# Patient Record
Sex: MALE | Race: WHITE | Employment: FULL TIME | URBAN - METROPOLITAN AREA
[De-identification: names, ages, dates, MRNs, and addresses within clinical notes are randomized per-mention and may not be internally consistent; named-entity substitution may affect disease eponyms.]

---

## 2021-06-14 ENCOUNTER — HOSPITAL ENCOUNTER (OUTPATIENT)
Dept: GENERAL RADIOLOGY | Age: 46
Discharge: HOME | End: 2021-06-14
Payer: COMMERCIAL

## 2021-06-14 ENCOUNTER — OFFICE VISIT (OUTPATIENT)
Dept: RHEUMATOLOGY | Age: 46
End: 2021-06-14
Payer: COMMERCIAL

## 2021-06-14 ENCOUNTER — HOSPITAL ENCOUNTER (OUTPATIENT)
Age: 46
Discharge: HOME | End: 2021-06-14
Payer: COMMERCIAL

## 2021-06-14 VITALS
SYSTOLIC BLOOD PRESSURE: 136 MMHG | HEART RATE: 70 BPM | HEIGHT: 69 IN | DIASTOLIC BLOOD PRESSURE: 78 MMHG | BODY MASS INDEX: 28.14 KG/M2 | WEIGHT: 190 LBS

## 2021-06-14 DIAGNOSIS — M19.90 INFLAMMATORY ARTHRITIS: ICD-10-CM

## 2021-06-14 DIAGNOSIS — G89.29 CHRONIC BILATERAL LOW BACK PAIN WITHOUT SCIATICA: ICD-10-CM

## 2021-06-14 DIAGNOSIS — M54.50 CHRONIC BILATERAL LOW BACK PAIN WITHOUT SCIATICA: ICD-10-CM

## 2021-06-14 DIAGNOSIS — M54.2 NECK PAIN: ICD-10-CM

## 2021-06-14 DIAGNOSIS — M19.90 INFLAMMATORY ARTHRITIS: Primary | ICD-10-CM

## 2021-06-14 DIAGNOSIS — M15.9 PRIMARY OSTEOARTHRITIS INVOLVING MULTIPLE JOINTS: ICD-10-CM

## 2021-06-14 LAB
A/G RATIO: 1.9 (ref 1.1–2.2)
ALBUMIN SERPL-MCNC: 4.8 G/DL (ref 3.4–5)
ALP BLD-CCNC: 84 U/L (ref 40–129)
ALT SERPL-CCNC: 24 U/L (ref 10–40)
ANION GAP SERPL CALCULATED.3IONS-SCNC: 17 MMOL/L (ref 3–16)
AST SERPL-CCNC: 22 U/L (ref 15–37)
BASOPHILS ABSOLUTE: 0.1 K/UL (ref 0–0.2)
BASOPHILS RELATIVE PERCENT: 1.2 %
BILIRUB SERPL-MCNC: <0.2 MG/DL (ref 0–1)
BUN BLDV-MCNC: 13 MG/DL (ref 7–20)
C-REACTIVE PROTEIN: <3 MG/L (ref 0–5.1)
CALCIUM SERPL-MCNC: 9.6 MG/DL (ref 8.3–10.6)
CHLORIDE BLD-SCNC: 100 MMOL/L (ref 99–110)
CO2: 23 MMOL/L (ref 21–32)
CREAT SERPL-MCNC: 1.1 MG/DL (ref 0.9–1.3)
EOSINOPHILS ABSOLUTE: 0.3 K/UL (ref 0–0.6)
EOSINOPHILS RELATIVE PERCENT: 5.2 %
GFR AFRICAN AMERICAN: >60
GFR NON-AFRICAN AMERICAN: >60
GLOBULIN: 2.5 G/DL
GLUCOSE BLD-MCNC: 98 MG/DL (ref 70–99)
HCT VFR BLD CALC: 45.7 % (ref 40.5–52.5)
HEMOGLOBIN: 15.6 G/DL (ref 13.5–17.5)
HEPATITIS B CORE IGM ANTIBODY: NORMAL
HEPATITIS B SURFACE ANTIGEN INTERPRETATION: NORMAL
HEPATITIS C ANTIBODY INTERPRETATION: NORMAL
LYMPHOCYTES ABSOLUTE: 1.6 K/UL (ref 1–5.1)
LYMPHOCYTES RELATIVE PERCENT: 29.6 %
MCH RBC QN AUTO: 31.8 PG (ref 26–34)
MCHC RBC AUTO-ENTMCNC: 34.2 G/DL (ref 31–36)
MCV RBC AUTO: 93 FL (ref 80–100)
MONOCYTES ABSOLUTE: 0.5 K/UL (ref 0–1.3)
MONOCYTES RELATIVE PERCENT: 8.9 %
NEUTROPHILS ABSOLUTE: 3.1 K/UL (ref 1.7–7.7)
NEUTROPHILS RELATIVE PERCENT: 55.1 %
PDW BLD-RTO: 13.9 % (ref 12.4–15.4)
PLATELET # BLD: 233 K/UL (ref 135–450)
PMV BLD AUTO: 8.3 FL (ref 5–10.5)
POTASSIUM SERPL-SCNC: 4.3 MMOL/L (ref 3.5–5.1)
RBC # BLD: 4.91 M/UL (ref 4.2–5.9)
RHEUMATOID FACTOR: 42 IU/ML
SEDIMENTATION RATE, ERYTHROCYTE: 6 MM/HR (ref 0–15)
SODIUM BLD-SCNC: 140 MMOL/L (ref 136–145)
TOTAL PROTEIN: 7.3 G/DL (ref 6.4–8.2)
TSH REFLEX FT4: 1.28 UIU/ML (ref 0.27–4.2)
WBC # BLD: 5.6 K/UL (ref 4–11)

## 2021-06-14 PROCEDURE — 73630 X-RAY EXAM OF FOOT: CPT

## 2021-06-14 PROCEDURE — 73130 X-RAY EXAM OF HAND: CPT

## 2021-06-14 PROCEDURE — 99244 OFF/OP CNSLTJ NEW/EST MOD 40: CPT | Performed by: INTERNAL MEDICINE

## 2021-06-14 PROCEDURE — 72100 X-RAY EXAM L-S SPINE 2/3 VWS: CPT

## 2021-06-14 PROCEDURE — 72040 X-RAY EXAM NECK SPINE 2-3 VW: CPT

## 2021-06-14 RX ORDER — PREDNISONE 1 MG/1
TABLET ORAL
Qty: 40 TABLET | Refills: 1 | Status: SHIPPED | OUTPATIENT
Start: 2021-06-14 | End: 2021-06-30

## 2021-06-14 NOTE — RESULT ENCOUNTER NOTE
Please call the patient and let him know that his lumbar spine, cervical spine, hand and foot x-ray show changes consistent with mild degenerative arthritis. We will explained in detail at next follow-up appointment.

## 2021-06-14 NOTE — LETTER
[facility]  [address] [id number]  Phone: [phone]  Fax: [phone]    Breanne Velasco MD        June 14, 2021     [place] [address]    Patient: Alyce Estevez  MR Number: [id number]  YOB: 1975  Date of Visit: 6/14/2021    Dear Dr. Broderick Soulier:    Thank you for your referral. Progress note attached in visit summary. If you have questions, please do not hesitate to call me. I look forward to following Francisco along with you.     Sincerely,        Breanne Velasco MD

## 2021-06-14 NOTE — PROGRESS NOTES
2021  Patient Name: Koko Weber  : 1975  Medical Record: <[id number]>      ASSESSMENT AND PLAN    Assessment/Plan:      ASSESSMENT:    1. Inflammatory arthritis    2. Primary osteoarthritis involving multiple joints    3. Neck pain    4. Chronic bilateral low back pain without sciatica        PLAN:     Gustavo Lewis was seen today for establish care. Diagnoses and all orders for this visit:    Inflammatory arthritis  -     CBC Auto Differential; Future  -     Comprehensive Metabolic Panel; Future  -     C-Reactive Protein; Future  -     Sedimentation Rate; Future  -     Rheumatoid Factor; Future  -     Hepatitis C Antibody; Future  -     Hepatitis B Surface Antigen; Future  -     Hepatitis B Core Antibody, IgM; Future  -     XR HAND RIGHT (MIN 3 VIEWS); Future  -     XR HAND LEFT (MIN 3 VIEWS); Future  -     XR FOOT RIGHT (MIN 3 VIEWS); Future  -     XR FOOT LEFT (MIN 3 VIEWS); Future  -     TSH WITH REFLEX TO FT4; Future  -     Cyclic Citrul Peptide Antibody, IgG; Future  -     Miscellaneous Sendout 1; Future  -     predniSONE (DELTASONE) 5 MG tablet; Take 3 tabs daily for 10 days and then 2 tabs daily    Primary osteoarthritis involving multiple joints    Neck pain  -     XR CERVICAL SPINE (2-3 VIEWS); Future    Chronic bilateral low back pain without sciatica  -     XR LUMBAR SPINE (2-3 VIEWS); Future    History suggestive of inflammatory arthritis, trace soft tissue swelling in PIP joints, RF 31.4, most likely possibility rheumatoid arthritis. Also component of osteoarthritis contributing to the joint symptoms  We will check additional labs to evaluate further for rheumatoid arthritis/systemic rheumatic disease  We will obtain baseline hand and foot x-rays, CBC, CMP and hepatitis panel  We will start prednisone 15 mg daily for 10 days and then decrease to 10 mg daily  DMARD therapy after reviewing blood work    Neck and back pain most likely due to degenerative disc disease.   No warning signs or radiculopathy. Will obtain cervical and lumbar spine x-rays. Advised to continue ibuprofen 800 mg twice a day as needed      The patient indicates understanding of these issues and agrees with the plan. Return in about 4 weeks (around 7/12/2021). The risks and benefits of my recommendations, as well as other treatment options, benefits and side effects werediscussed with the patient. All questions were answered. I reviewed patient's history, referral documents and electronic medical records  Copy of consult note is being routedelectronically/faxed to referring physician         MEDICATIONS  Current Outpatient Medications   Medication Sig Dispense Refill    predniSONE (DELTASONE) 5 MG tablet Take 3 tabs daily for 10 days and then 2 tabs daily 40 tablet 1    acetaminophen (TYLENOL) 325 MG tablet Take 650 mg by mouth every 6 hours as needed for Pain.  albuterol (PROAIR HFA) 108 (90 BASE) MCG/ACT inhaler Inhale 2 puffs into the lungs See Admin Instructions. Use every 4-6 hours while awake for 7-10 days then 4- 6 hours PRN wheezing, cough and/or congestion. Dispense with SPACER and Instruct on use 1 Inhaler 1     No current facility-administered medications for this visit. ALLERGIES  No Known Allergies      Comments  No specialty comments available. REFERRING PHYSICIAN:Molly Gonzales    HISTORY OF PRESENT ILLNESS  Francisco Weber is a 39 y.o. male with no significant past medical history who is being seen for follow up evaluation of  joint pain. Symptoms started several years ago and have progressively gotten worse over the past 5 to 6 months. He has been in his hands, feet, elbows, wrists, knees and hips. He has pain on daily basis. Symptoms are worse in the hands. Pain waxes and wanes in severity. He describes his pain as aching, 5 out of 10 without any significant living or aggravating factors. He denies any swelling in the joints.   Has a morning stiffness lasting for 2 to 3 or swollen lymph nodes. Neurological: No history of headaches, seizure or focal weakness. No history of neuropathies, paresthesias or hyperesthesias, facial droop, diplopia  Psychiatric: No history of bipolar disease, anxiety, depression  Endocrine: Denies any polyuria, polydipsia and osteoporosis  Allergic/Immunologic: No nasal congestion or hives. I have reviewed patients Past medical History, Social History and Family History as mentioned in her chart and this remains unchanged fromprevious. Past Medical History:   Diagnosis Date    Pneumonia      Past Surgical History:   Procedure Laterality Date    HERNIA REPAIR Bilateral     inguinal at age 8     Social History     Socioeconomic History    Marital status:      Spouse name: Not on file    Number of children: Not on file    Years of education: Not on file    Highest education level: Not on file   Occupational History    Not on file   Tobacco Use    Smoking status: Current Every Day Smoker     Packs/day: 0.50     Types: Cigarettes    Smokeless tobacco: Never Used   Vaping Use    Vaping Use: Never used   Substance and Sexual Activity    Alcohol use: Yes     Comment: occas    Drug use: No    Sexual activity: Not on file   Other Topics Concern    Not on file   Social History Narrative    Not on file     Social Determinants of Health     Financial Resource Strain:     Difficulty of Paying Living Expenses:    Food Insecurity:     Worried About [address] in the Last Year:     [facility] in the Last Year:    Transportation Needs:     Lack of Transportation (Medical):      Lack of Transportation (Non-Medical):    Physical Activity:     Days of Exercise per Week:     Minutes of Exercise per Session:    Stress:     Feeling of Stress :    Social Connections:     Frequency of Communication with Friends and Family:     Frequency of Social Gatherings with Friends and Family:     Attends Yazdanism Services:     Active extra ocular movements intact, conjunctiva normal   HEENT:  Atraumatic, normocephalic, external ears normal, oropharynx moist, no pharyngeal exudates. Respiratory:  No respiratory distress  GI:  Soft, nondistended, normal bowel sounds, nontender, noorganomegaly, no mass, no rebound, no guarding   :  No costovertebral angle tenderness   Integument:  Well hydrated, no rash or telangiectasias  Lymphatic:  No lymphadenopathy noted   Neurologic:   Alert & oriented x 3, CN 2-12 normal, no focal deficits noted. Sensations Intact. Muscle strength 5/5 proximallyand distally in upper and lower extremities. Psychiatric:  Speech and behavior appropriate           LABS AND IMAGING  Outside data reviewed and in HPI    No results found for: WBC, RBC, HGB, HCT, PLT, MCV, MCH, MCHC, RDW, NRBC, SEGSPCT, BANDSPCT, BLASTSPCT, METASPCT, LYMPHOPCT, PROMYELOPCT, MONOPCT, MYELOPCT, EOSPCT, BASOPCT, MONOSABS, LYMPHSABS, EOSABS, BASOSABS, DIFFTYPE    Chemistry    No results found for: NA, K, CL, CO2, BUN, CREATININE No results found for: CALCIUM, ALKPHOS, AST, ALT, BILITOT       No results found for: SEDRATE  No results found for: CRP  No results found for: GIULIA, LEXX, SSA, SSB, C3, C4  No results found for: RF, CCPABIGG  No results found for: GIULIA, ANATITER, ANAINT, PATH  No results found for: DSDNAG, DSDNAIGGIFA  No results found for: SSAROAB, SSALAAB  No results found for: SMAB, RNPAB  No results found for: CENTABIGG  No results found for: C3, C4, ACE  No results found for: JO1, VITD25, [id number]  No results found for: Leotis Alter  No results found for: BRBOGCCO47  No results found for: TSHFT4, TSH  No results found for: VITD25    Labs reviewed 4/21/2021  RF 31.4  ESR 6  ######################################################################    I thank you for giving me theopportunity to participate in [address] [facility]. If you have any questions or concerns please feel free to contact me.  I look forward to following  [facility] along with you. Electronically signed by: Natalie Hernandez MD, MD, 6/14/2021 11:43 AM    Documentation was done using voice recognition dragon software. Every effort was made to ensure accuracy;however, inadvertent unintentional computerized transcription errors may be present.

## 2021-06-15 LAB — CYCLIC CITRULLINATED PEPTIDE ANTIBODY IGG: 137.2 U/ML (ref 0–2.9)

## 2021-06-17 LAB
ANTINUCLEAR AB INTERPRETIVE COMMENT: NORMAL
ANTINUCLEAR ANTIBODY, HEP-2, IGG: NORMAL

## 2021-06-28 ENCOUNTER — TELEPHONE (OUTPATIENT)
Dept: INTERNAL MEDICINE CLINIC | Age: 46
End: 2021-06-28

## 2021-06-29 NOTE — PROGRESS NOTES
and were explained to the patient    Neck pain most likely due to degenerative disc disease. No warning signs or radiculopathy  Continues to be symptomatic  We will refer to physical therapy  Start Flexeril 5 to 10 mg at bedtime  Continue ibuprofen as needed    The patient indicates understanding of these issues and agrees with the plan. Return in about 6 weeks (around 8/11/2021). The risks and benefits of my recommendations, as well as other treatment options, benefits and side effects werediscussed with the patient. All questions were answered. MEDICATIONS  Current Outpatient Medications   Medication Sig Dispense Refill    Etanercept (ENBREL MINI) 50 MG/ML SOCT Inject 50 mg into the skin once a week 4 Cartridge 5    cyclobenzaprine (FLEXERIL) 5 MG tablet Take 1 or 2 tabs at night. Can take 1 tab in am if tolerated 90 tablet 1    predniSONE (DELTASONE) 5 MG tablet Take 3 tabs daily for 10 days,2.5 tabs for 10 days,2 tabs for 10 days,1.5 tab for 10 days,1 tab for 10 days, 0.5 tab for 10 days and stop 105 tablet 0    acetaminophen (TYLENOL) 325 MG tablet Take 650 mg by mouth every 6 hours as needed for Pain.  albuterol (PROAIR HFA) 108 (90 BASE) MCG/ACT inhaler Inhale 2 puffs into the lungs See Admin Instructions. Use every 4-6 hours while awake for 7-10 days then 4- 6 hours PRN wheezing, cough and/or congestion. Dispense with SPACER and Instruct on use 1 Inhaler 1     No current facility-administered medications for this visit. ALLERGIES  No Known Allergies      Comments  No specialty comments available. Background history:  Claude Blevins is a 39 y.o. male with no significant past medical history who is being seen for follow up evaluation of  joint pain. Symptoms started several years ago and have progressively gotten worse over the past 5 to 6 months. He has been in his hands, feet, elbows, wrists, knees and hips. He has pain on daily basis.   Symptoms are worse in the hands. Pain waxes and wanes in severity. He describes his pain as aching, 5 out of 10 without any significant living or aggravating factors. He denies any swelling in the joints. Has a morning stiffness lasting for 2 to 3 hours. He has some difficulty opening doorknobs and jars. He was given naproxen which helped initially for 2 weeks then then stopped working. He now takes ibuprofen 800 mg twice a day and alternates it with Tylenol with some relief. He had a blood work by PCP that showed RF 31.4 and ESR 6. He has a neck and the low back pain for past 6 months. He denies any trauma. He denies any radiation, tingling or numbness, weakness, bowel or bladder dysfunction or saddle anesthesia. He has stiffness in the low back in the morning lasting for up to 2 hours. Pain loosens up as a day progresses. He denies any relation to activity or rest.  He denies family history of rheumatoid arthritis. He denies psoriasis, inflammatory bowel disease, inflammatory back pain, dactylitis, enthesitis, tenosynovitis or uveitis. He denies fever, weight loss or swollen glands. He denies preceding viral infection, urinary tract infection, urinary discharge or diarrhea. Interim history: He presents for follow-up of rheumatoid arthritis and osteoarthritis. Blood work came back positive for rheumatoid arthritis. He finished prednisone 1 day ago. He reports pain in his neck associated with stiffness. He denies any radiation, tingling or numbness, weakness, bowel or bladder dysfunction. He alternates naproxen with ibuprofen which helps. Cervical spine x-ray showed changes consistent with mild degenerative arthritis. He continues to have pain in his joints. Symptoms are worse in his elbows and shoulders. He denies any swelling in the joints. Morning stiffness can last for up to 1 hour. Joint pain is milder compared to pain in the neck.   Hand and foot x-rays show changes consistent with mild degenerative/osteoarthritis. HPI  Review of Systems    REVIEW OF SYSTEMS:   Constitutional: No unanticipated weight loss or fevers. Integumentary: No rash, photosensitivity, malar rash, livedo reticularis, alopecia and Raynaud's symptoms, sclerodactyly, skin tightening  Eyes: negative for visual disturbance and persistent redness, discharge from eyes   ENT: - No tinnitus, loss of hearing, vertigo, or recurrent ear infections.  - No history of nasal/oral ulcers. - No history of dry eyes/dry mouth  Cardiovascular: No history of pericarditis, chest pain or murmur or palpitations  Respiratory: No shortness of breath, cough or history of interstitial lung disease. No history of pleurisy. No history of tuberculosis or atypical infections. Gastrointestinal: No history of heart burn, dysphagia or esophageal dysmotility. No change in bowel habits or any inflammatory bowel disease. Genitourinary: No history renal disease, miscarriages. Hematologic/Lymphatic: No abnormal bruising or bleeding, blood clots or swollen lymph nodes. Neurological: No history of headaches, seizure or focal weakness. No history of neuropathies, paresthesias or hyperesthesias, facial droop, diplopia  Psychiatric: No history of bipolar disease, anxiety, depression  Endocrine: Denies any polyuria, polydipsia and osteoporosis  Allergic/Immunologic: No nasal congestion or hives. I have reviewed patients Past medical History, Social History and Family History as mentioned in her chart and this remains unchanged fromprevious.     Past Medical History:   Diagnosis Date    Pneumonia      Past Surgical History:   Procedure Laterality Date    HERNIA REPAIR Bilateral     inguinal at age 8     Social History     Socioeconomic History    Marital status:      Spouse name: Not on file    Number of children: Not on file    Years of education: Not on file    Highest education level: Not on file   Occupational History    Not on file Tobacco Use    Smoking status: Current Every Day Smoker     Packs/day: 0.50     Types: Cigarettes    Smokeless tobacco: Never Used   Vaping Use    Vaping Use: Never used   Substance and Sexual Activity    Alcohol use: Yes     Comment: occas    Drug use: No    Sexual activity: Not on file   Other Topics Concern    Not on file   Social History Narrative    Not on file     Social Determinants of Health     Financial Resource Strain:     Difficulty of Paying Living Expenses:    Food Insecurity:     Worried About Running Out of Food in the Last Year:     Ran Out of Food in the Last Year:    Transportation Needs:     Lack of Transportation (Medical):  Lack of Transportation (Non-Medical):    Physical Activity:     Days of Exercise per Week:     Minutes of Exercise per Session:    Stress:     Feeling of Stress :    Social Connections:     Frequency of Communication with Friends and Family:     Frequency of Social Gatherings with Friends and Family:     Attends Yazdanism Services:     Active Member of Clubs or Organizations:     Attends Club or Organization Meetings:     Marital Status:    Intimate Partner Violence:     Fear of Current or Ex-Partner:     Emotionally Abused:     Physically Abused:     Sexually Abused:      History reviewed. No pertinent family history.       PHYSICAL EXAM   Vitals:    06/30/21 0721   BP: 118/78   Pulse: 70   Weight: 198 lb 12.8 oz (90.2 kg)     Physical Exam  Constitutional:  Well developed, well nourished, no acute distress, non-toxic appearance   Musculoskeletal:    RIGHT  Swell  Tender  ROM  LEFT  Swell  Tender  ROM    DIP2  0  0   [place]berden  0  0   Heberden   DIP3  0  0   [place]berden  0  0   [place]berden   DIP4  0  0   Heberden  0  0   Heberden   DIP5  0  0   Heberden  0  0   [place]berden   PIP1  0  0  FULL   0  0  FULL    PIP2  0  + FULL   0  + FULL    PIP3  0  +  FULL   0  +  FULL    PIP4  0  +  FULL   0  +  FULL    PIP5  0  +  FULL   0  +  FULL    MCP1  0  0  FULL 0  0  FULL    MCP2  0  0   bony change  0  0  FULL    MCP3  0  0   bony change  0  0  FULL    MCP4  0  0  FULL   0  0  FULL    MCP5  0  0  FULL   0  0  FULL    Wrist  0  0  FULL   0  0  FULL    Elbow  0  + FULL   0  + FULL    Shouldr  0  0  FULL   0  0  FULL    Hip  0  0  FULL   0  0  FULL    Knee  0  0   crepitus  0  0   crepitus   Ankle  0  0  FULL   0  0  FULL    MTP1  0  0   hallux valgus  0  0   hallux valgus   MTP2  0  0   bony change  0  0   bony change   MTP3  0  0   bony change  0  0   bony change   MTP4  0  0  FULL   0  0  FULL    MTP5  0  0  FULL   0  0  FULL    IP1  0  0  FULL   0  0  FULL    IP2  0  0  FULL   0  0  FULL    IP3  0  0  FULL   0  0  FULL    IP4  0  0  FULL   0  0  FULL    IP5  0  0  FULL   0 0 FULL     Trace soft tissue swelling in bilateral 2 to 5 PIP joints  Squaring, tenderness and bony enlargement of bilateral CMC joints  Ambulates without assistance, normal gait  Neck: Full ROM, supple, mild tenderness  Back- no tenderness. Bilateral SLR negative  Eyes:  PERRL, extra ocular movements intact, conjunctiva normal   HEENT:  Atraumatic, normocephalic, external ears normal, oropharynx moist, no pharyngeal exudates. Respiratory:  No respiratory distress  GI:  Soft, nondistended, normal bowel sounds, nontender, noorganomegaly, no mass, no rebound, no guarding   :  No costovertebral angle tenderness   Integument:  Well hydrated, no rash or telangiectasias  Lymphatic:  No lymphadenopathy noted   Neurologic:   Alert & oriented x 3, CN 2-12 normal, no focal deficits noted. Sensations Intact. Muscle strength 5/5 proximallyand distally in upper and lower extremities.    Psychiatric:  Speech and behavior appropriate           LABS AND IMAGING  Outside data reviewed and in HPI    Lab Results   Component Value Date    WBC 5.6 06/14/2021    RBC 4.91 06/14/2021    HGB 15.6 06/14/2021    HCT 45.7 06/14/2021     06/14/2021    MCV 93.0 06/14/2021    MCH 31.8 06/14/2021    MCHC 34.2 06/14/2021 RDW 13.9 06/14/2021    LYMPHOPCT 29.6 06/14/2021    MONOPCT 8.9 06/14/2021    BASOPCT 1.2 06/14/2021    MONOSABS 0.5 06/14/2021    LYMPHSABS 1.6 06/14/2021    EOSABS 0.3 06/14/2021    BASOSABS 0.1 06/14/2021       Chemistry        Component Value Date/Time     06/14/2021 1157    K 4.3 06/14/2021 1157     06/14/2021 1157    CO2 23 06/14/2021 1157    BUN 13 06/14/2021 1157    CREATININE 1.1 06/14/2021 1157        Component Value Date/Time    CALCIUM 9.6 06/14/2021 1157    ALKPHOS 84 06/14/2021 1157    AST 22 06/14/2021 1157    ALT 24 06/14/2021 1157    BILITOT <0.2 06/14/2021 1157          Lab Results   Component Value Date    SEDRATE 6 06/14/2021     Lab Results   Component Value Date    CRP <3.0 06/14/2021     No results found for: GIULIA, LEXX, SSA, SSB, C3, C4  Lab Results   Component Value Date    RF 42.0 06/14/2021     No results found for: GIULIA, ANATITER, ANAINT, PATH  No results found for: DSDNAG, DSDNAIGGIFA  No results found for: SSAROAB, SSALAAB  No results found for: SMAB, RNPAB  No results found for: CENTABIGG  No results found for: C3, C4, ACE  No results found for: JO1, VITD25, [id number]  No results found for: Migdalia [place]  No results found for: Jerod Woody  Lab Results   Component Value Date    TSHFT4 1.28 06/14/2021     No results found for: VITD25    Labs reviewed 4/21/2021  RF 31.4  ESR 6  ######################################################################    I thank you for giving me theopportunity to participate in [address] [facility]. If you have any questions or concerns please feel free to contact me. I look forward to following  Francisco along with you. Electronically signed by: Barb Siegel MD, MD, 6/30/2021 8:20 AM    Documentation was done using voice recognition dragon software. Every effort was made to ensure accuracy;however, inadvertent unintentional computerized transcription errors may be present.

## 2021-06-30 ENCOUNTER — OFFICE VISIT (OUTPATIENT)
Dept: RHEUMATOLOGY | Age: 46
End: 2021-06-30
Payer: COMMERCIAL

## 2021-06-30 ENCOUNTER — TELEPHONE (OUTPATIENT)
Dept: RHEUMATOLOGY | Age: 46
End: 2021-06-30

## 2021-06-30 VITALS
DIASTOLIC BLOOD PRESSURE: 78 MMHG | BODY MASS INDEX: 29.36 KG/M2 | SYSTOLIC BLOOD PRESSURE: 118 MMHG | HEART RATE: 70 BPM | WEIGHT: 198.8 LBS

## 2021-06-30 DIAGNOSIS — M50.30 DDD (DEGENERATIVE DISC DISEASE), CERVICAL: ICD-10-CM

## 2021-06-30 DIAGNOSIS — M15.9 PRIMARY OSTEOARTHRITIS INVOLVING MULTIPLE JOINTS: ICD-10-CM

## 2021-06-30 DIAGNOSIS — M05.79 RHEUMATOID ARTHRITIS INVOLVING MULTIPLE SITES WITH POSITIVE RHEUMATOID FACTOR (HCC): Primary | ICD-10-CM

## 2021-06-30 DIAGNOSIS — Z79.899 HIGH RISK MEDICATION USE: ICD-10-CM

## 2021-06-30 PROCEDURE — 99214 OFFICE O/P EST MOD 30 MIN: CPT | Performed by: INTERNAL MEDICINE

## 2021-06-30 RX ORDER — ETANERCEPT 50 MG/ML
50 SOLUTION SUBCUTANEOUS WEEKLY
Qty: 4 CARTRIDGE | Refills: 5 | Status: SHIPPED | OUTPATIENT
Start: 2021-06-30 | End: 2021-11-16 | Stop reason: SDUPTHER

## 2021-06-30 RX ORDER — CYCLOBENZAPRINE HCL 5 MG
TABLET ORAL
Qty: 90 TABLET | Refills: 1 | Status: SHIPPED | OUTPATIENT
Start: 2021-06-30 | End: 2021-11-16 | Stop reason: SDUPTHER

## 2021-06-30 RX ORDER — PREDNISONE 1 MG/1
TABLET ORAL
Qty: 105 TABLET | Refills: 0 | Status: SHIPPED | OUTPATIENT
Start: 2021-06-30 | End: 2021-08-17

## 2021-06-30 NOTE — PATIENT INSTRUCTIONS
-     Quantiferon, Incubated; Future  -     Etanercept (ENBREL MINI) 50 MG/ML SOCT; Inject 50 mg into the skin once a week  -     cyclobenzaprine (FLEXERIL) 5 MG tablet; Take 1 or 2 tabs at night. Can take 1 tab in am if tolerated  -     predniSONE (DELTASONE) 5 MG tablet; Take 3 tabs daily for 10 days,2.5 tabs for 10 days,2 tabs for 10 days,1.5 tab for 10 days,1 tab for 10 days, 0.5 tab for 10 days and stop  -     cyclobenzaprine (FLEXERIL) 5 MG tablet; Take 1 or 2 tabs at night.  Can take 1 tab in am if tolerated  -     External Referral To Physical Therapy

## 2021-07-07 NOTE — TELEPHONE ENCOUNTER
FOR YOUR INFORMATION  The prior authorization request has been approved and is in queue to be reprocessed. PA Date Range: 07/02/2021 through 07/02/2023  PA Number: [id number]    Update from Saeid.

## 2021-07-08 NOTE — TELEPHONE ENCOUNTER
Patient notified PA for Enbrel approved. Patient is stopping by Office on 6/9/21 to  Device and learn how to use it.

## 2021-08-16 NOTE — PROGRESS NOTES
2021  Patient Name: Brian Weber  : 1975  Medical Record: [id number]      ASSESSMENT AND PLAN    Assessment/Plan:      ASSESSMENT:    1. Rheumatoid arthritis involving multiple sites with positive rheumatoid factor ([facility] [address]    2. Primary osteoarthritis involving multiple joints    3. High risk medication use    4. DDD (degenerative disc disease), cervical        PLAN:     Mario Álvarez was seen today for follow-up. Diagnoses and all orders for this visit:    Rheumatoid arthritis involving multiple sites with positive rheumatoid factor (HCC)    Primary osteoarthritis involving multiple joints  -     meloxicam (MOBIC) 15 MG tablet; Take 1 tablet by mouth daily    High risk medication use  -     ALT; Standing  -     AST; Standing  -     CBC Auto Differential; Standing  -     Creatinine, Serum; Standing    DDD (degenerative disc disease), cervical  -     meloxicam (MOBIC) 15 MG tablet; Take 1 tablet by mouth daily    History suggestive of inflammatory arthritis, soft tissue swelling in PIP joints,   RF, CCP positive-rheumatoid arthritis in symmetrical polyarticular distribution   also component of osteoarthritis contributing to the joint symptoms  Hand and foot x-rays with mild degenerative changes  No active synovitis on joint exam  Continue Enbrel 50 mg once a week  Will avoid methotrexate due to heavy alcohol use      Neck pain most likely due to degenerative disc disease. No warning signs or radiculopathy  Continues to be symptomatic  Advised to start physical therapy  Start meloxicam 15 mg daily  Continue Flexeril 5 to 10 mg at bedtime  Continue follow-up with a spine specialist      The patient was advised that NSAID-type medications have two very important potential side effects: gastrointestinal irritation including hemorrhage and renal injuries. He was asked to take the medication with food and to stop if he experiences any GI upset. I asked him to call for vomiting, abdominal pain or black/bloody stools. The patient expresses understanding of these issues and questions were answered. The patient indicates understanding of these issues and agrees with the plan. Return in about 3 months (around 11/17/2021). The risks and benefits of my recommendations, as well as other treatment options, benefits and side effects werediscussed with the patient. All questions were answered. MEDICATIONS  Current Outpatient Medications   Medication Sig Dispense Refill    meloxicam (MOBIC) 15 MG tablet Take 1 tablet by mouth daily 30 tablet 5    Etanercept (ENBREL MINI) 50 MG/ML SOCT Inject 50 mg into the skin once a week 4 Cartridge 5    cyclobenzaprine (FLEXERIL) 5 MG tablet Take 1 or 2 tabs at night. Can take 1 tab in am if tolerated 90 tablet 1    acetaminophen (TYLENOL) 325 MG tablet Take 650 mg by mouth every 6 hours as needed for Pain.  albuterol (PROAIR HFA) 108 (90 BASE) MCG/ACT inhaler Inhale 2 puffs into the lungs See Admin Instructions. Use every 4-6 hours while awake for 7-10 days then 4- 6 hours PRN wheezing, cough and/or congestion. Dispense with SPACER and Instruct on use 1 Inhaler 1     No current facility-administered medications for this visit. ALLERGIES  No Known Allergies      Comments  No specialty comments available. Background history:  Mala Dumont is a 39 y.o. male with no significant past medical history who is being seen for follow up evaluation of  joint pain. Symptoms started several years ago and have progressively gotten worse over the past 5 to 6 months. He has been in his hands, feet, elbows, wrists, knees and hips. He has pain on daily basis. Symptoms are worse in the hands. Pain waxes and wanes in severity. He describes his pain as aching, 5 out of 10 without any significant living or aggravating factors. He denies any swelling in the joints. Has a morning stiffness lasting for 2 to 3 hours. He has some difficulty opening doorknobs and jars.   He was given naproxen which helped initially for 2 weeks then then stopped working. He now takes ibuprofen 800 mg twice a day and alternates it with Tylenol with some relief. He had a blood work by PCP that showed RF 31.4 and ESR 6. He has a neck and the low back pain for past 6 months. He denies any trauma. He denies any radiation, tingling or numbness, weakness, bowel or bladder dysfunction or saddle anesthesia. He has stiffness in the low back in the morning lasting for up to 2 hours. Pain loosens up as a day progresses. He denies any relation to activity or rest.  He denies family history of rheumatoid arthritis. He denies psoriasis, inflammatory bowel disease, inflammatory back pain, dactylitis, enthesitis, tenosynovitis or uveitis. He denies fever, weight loss or swollen glands. He denies preceding viral infection, urinary tract infection, urinary discharge or diarrhea. Interim history: He presents for follow-up of rheumatoid arthritis and osteoarthritis. Blood work came back positive for rheumatoid arthritis. He started Enbrel 4 weeks ago. He has noticed improvement in joint symptoms yet. He continues to have achiness in the joints. He denies any swelling in the joints. Morning stiffness last for less than 1 hour  He reports pain in his neck associated with stiffness. He denies any radiation, tingling or numbness, weakness, bowel or bladder dysfunction. Cervical spine x-ray showed changes consistent with mild degenerative arthritis. He saw a spine specialist.  He had MRI of the cervical spine that showED changes consistent with degenerative arthritis. He was referred to physical therapy but has not started it yet. Hand and foot x-rays show changes consistent with mild degenerative/osteoarthritis. HPI  Review of Systems    REVIEW OF SYSTEMS:   Constitutional: No unanticipated weight loss or fevers.    Integumentary: No rash, photosensitivity, malar rash, livedo reticularis, alopecia and Raynaud's symptoms, sclerodactyly, skin tightening  Eyes: negative for visual disturbance and persistent redness, discharge from eyes   ENT: - No tinnitus, loss of hearing, vertigo, or recurrent ear infections.  - No history of nasal/oral ulcers. - No history of dry eyes/dry mouth  Cardiovascular: No history of pericarditis, chest pain or murmur or palpitations  Respiratory: No shortness of breath, cough or history of interstitial lung disease. No history of pleurisy. No history of tuberculosis or atypical infections. Gastrointestinal: No history of heart burn, dysphagia or esophageal dysmotility. No change in bowel habits or any inflammatory bowel disease. Genitourinary: No history renal disease, miscarriages. Hematologic/Lymphatic: No abnormal bruising or bleeding, blood clots or swollen lymph nodes. Neurological: No history of headaches, seizure or focal weakness. No history of neuropathies, paresthesias or hyperesthesias, facial droop, diplopia  Psychiatric: No history of bipolar disease, anxiety, depression  Endocrine: Denies any polyuria, polydipsia and osteoporosis  Allergic/Immunologic: No nasal congestion or hives. I have reviewed patients Past medical History, Social History and Family History as mentioned in her chart and this remains unchanged fromprevious. Past Medical History:   Diagnosis Date    Pneumonia      Past Surgical History:   Procedure Laterality Date    HERNIA REPAIR Bilateral     inguinal at age 8     Social History     Socioeconomic History    Marital status:      Spouse name: Not on file    Number of children: Not on file    Years of education: Not on file    Highest education level: Not on file   Occupational History    Not on file   Tobacco Use    Smoking status: Current Every Day Smoker     Packs/day: 0.50     Types: Cigarettes    Smokeless tobacco: Never Used   Vaping Use    Vaping Use: Never used   Substance and Sexual Activity    Alcohol use:  Yes Comment: occas    Drug use: No    Sexual activity: Not on file   Other Topics Concern    Not on file   Social History Narrative    Not on file     Social Determinants of Health     Financial Resource Strain:     Difficulty of Paying Living Expenses:    Food Insecurity:     Worried About Running Out of Food in the Last Year:     [address] in the Last Year:    Transportation Needs:     Lack of Transportation (Medical):  Lack of Transportation (Non-Medical):    Physical Activity:     Days of Exercise per Week:     Minutes of Exercise per Session:    Stress:     Feeling of Stress :    Social Connections:     Frequency of Communication with Friends and Family:     Frequency of Social Gatherings with Friends and Family:     Attends [employer] Services:     Active Member of Clubs or Organizations:     Attends Club or Organization Meetings:     Marital Status:    Intimate Partner Violence:     Fear of Current or Ex-Partner:     Emotionally Abused:     Physically Abused:     Sexually Abused:      History reviewed. No pertinent family history.       PHYSICAL EXAM   Vitals:    08/17/21 0741   BP: 132/80   Pulse: 86   Weight: 199 lb (90.3 kg)     Physical Exam  Constitutional:  Well developed, well nourished, no acute distress, non-toxic appearance   Musculoskeletal:    RIGHT  Swell  Tender  ROM  LEFT  Swell  Tender  ROM    DIP2  0  0   [place]  0  0   [place]   DIP3  0  0   [place]  0  0   [place]   DIP4  0  0   [place]  0  0   [place]   DIP5  0  0   [place]  0  0   [place]   PIP1  0  0  FULL   0  0  FULL    PIP2  0  + FULL   0  + FULL    PIP3  0  +  FULL   0  +  FULL    PIP4  0  +  FULL   0  +  FULL    PIP5  0  +  FULL   0  +  FULL    MCP1  0  0  FULL   0  0  FULL    MCP2  0  0   bony change  0  0  FULL    MCP3  0  0   bony change  0  0  FULL    MCP4  0  0  FULL   0  0  FULL    MCP5  0  0  FULL   0  0  FULL    Wrist  0  0  FULL   0  0  FULL    Elbow  0  + FULL   0  + FULL    Shouldr  0  0 FULL   0  0  FULL    Hip  0  0  FULL   0  0  FULL    Knee  0  0   crepitus  0  0   crepitus   Ankle  0  0  FULL   0  0  FULL    MTP1  0  0   hallux valgus  0  0   hallux valgus   MTP2  0  0   bony change  0  0   bony change   MTP3  0  0   bony change  0  0   bony change   MTP4  0  0  FULL   0  0  FULL    MTP5  0  0  FULL   0  0  FULL    IP1  0  0  FULL   0  0  FULL    IP2  0  0  FULL   0  0  FULL    IP3  0  0  FULL   0  0  FULL    IP4  0  0  FULL   0  0  FULL    IP5  0  0  FULL   0 0 FULL       Squaring, tenderness and bony enlargement of bilateral CMC joints  Ambulates without assistance, normal gait  Neck: Full ROM, supple, mild tenderness  Back- no tenderness. Bilateral SLR negative  Eyes:  PERRL, extra ocular movements intact, conjunctiva normal   HEENT:  Atraumatic, normocephalic, external ears normal, oropharynx moist, no pharyngeal exudates. Respiratory:  No respiratory distress  GI:  Soft, nondistended, normal bowel sounds, nontender, noorganomegaly, no mass, no rebound, no guarding   :  No costovertebral angle tenderness   Integument:  Well hydrated, no rash or telangiectasias  Lymphatic:  No lymphadenopathy noted   Neurologic:   Alert & oriented x 3, CN 2-12 normal, no focal deficits noted. Sensations Intact. Muscle strength 5/5 proximallyand distally in upper and lower extremities.    Psychiatric:  Speech and behavior appropriate           LABS AND IMAGING  Outside data reviewed and in HPI    Lab Results   Component Value Date    WBC 5.6 06/14/2021    RBC 4.91 06/14/2021    HGB 15.6 06/14/2021    HCT 45.7 06/14/2021     06/14/2021    MCV 93.0 06/14/2021    MCH 31.8 06/14/2021    MCHC 34.2 06/14/2021    RDW 13.9 06/14/2021    LYMPHOPCT 29.6 06/14/2021    MONOPCT 8.9 06/14/2021    BASOPCT 1.2 06/14/2021    MONOSABS 0.5 06/14/2021    LYMPHSABS 1.6 06/14/2021    EOSABS 0.3 06/14/2021    BASOSABS 0.1 06/14/2021       Chemistry        Component Value Date/Time     06/14/2021 1157    K 4.3 06/14/2021 1157     06/14/2021 1157    CO2 23 06/14/2021 1157    BUN 13 06/14/2021 1157    CREATININE 1.1 06/14/2021 1157        Component Value Date/Time    CALCIUM 9.6 06/14/2021 1157    ALKPHOS 84 06/14/2021 1157    AST 22 06/14/2021 1157    ALT 24 06/14/2021 1157    BILITOT <0.2 06/14/2021 1157          Lab Results   Component Value Date    SEDRATE 6 06/14/2021     Lab Results   Component Value Date    CRP <3.0 06/14/2021     No results found for: GIULIA, LEXX, SSA, SSB, C3, C4  Lab Results   Component Value Date    RF 42.0 06/14/2021     No results found for: GIULIA, ANATITER, ANAINT, PATH  No results found for: DSDNAG, DSDNAIGGIFA  No results found for: SSAROAB, SSALAAB  No results found for: SMAB, RNPAB  No results found for: CENTABIGG  No results found for: C3, C4, ACE  No results found for: [place], [id number]  No results found for: Naseem Alvarez  No results found for: [id number]  Lab Results   Component Value Date    TSHFT4 1.28 06/14/2021     No results found for: VITD25    Labs reviewed 4/21/2021  RF 31.4  ESR 6  ######################################################################    I thank you for giving me theopportunity to participate in [address] [facility]. If you have any questions or concerns please feel free to contact me. I look forward to following  Francisco along with you. Electronically signed by: Anmol Peña MD, MD, 8/17/2021 8:03 AM    Documentation was done using voice recognition dragon software. Every effort was made to ensure accuracy;however, inadvertent unintentional computerized transcription errors may be present. stable

## 2021-08-17 ENCOUNTER — OFFICE VISIT (OUTPATIENT)
Dept: RHEUMATOLOGY | Age: 46
End: 2021-08-17
Payer: COMMERCIAL

## 2021-08-17 VITALS
HEART RATE: 86 BPM | WEIGHT: 199 LBS | DIASTOLIC BLOOD PRESSURE: 80 MMHG | BODY MASS INDEX: 29.39 KG/M2 | SYSTOLIC BLOOD PRESSURE: 132 MMHG

## 2021-08-17 DIAGNOSIS — M50.30 DDD (DEGENERATIVE DISC DISEASE), CERVICAL: ICD-10-CM

## 2021-08-17 DIAGNOSIS — M05.79 RHEUMATOID ARTHRITIS INVOLVING MULTIPLE SITES WITH POSITIVE RHEUMATOID FACTOR (HCC): Primary | ICD-10-CM

## 2021-08-17 DIAGNOSIS — Z79.899 HIGH RISK MEDICATION USE: ICD-10-CM

## 2021-08-17 DIAGNOSIS — M15.9 PRIMARY OSTEOARTHRITIS INVOLVING MULTIPLE JOINTS: ICD-10-CM

## 2021-08-17 PROCEDURE — 99214 OFFICE O/P EST MOD 30 MIN: CPT | Performed by: INTERNAL MEDICINE

## 2021-08-17 RX ORDER — MELOXICAM 15 MG/1
15 TABLET ORAL DAILY
Qty: 30 TABLET | Refills: 5 | Status: SHIPPED | OUTPATIENT
Start: 2021-08-17 | End: 2022-03-14 | Stop reason: SDUPTHER

## 2021-09-09 ENCOUNTER — TELEPHONE (OUTPATIENT)
Dept: RHEUMATOLOGY | Age: 46
End: 2021-09-09

## 2021-09-09 NOTE — TELEPHONE ENCOUNTER
Pt called stating that his wife tested positive for covid. His test was negative. When he was tested, he was advised to hold off taking the Enbrel until his results came in. Spoke with  and . he can take the enbrel if he is asymptomatic. He would need to hold it if he becomes symptomatic and retest. He will need to be asymptomatic 2 weeks before restarting the medication. Pt was informed.

## 2021-11-16 ENCOUNTER — OFFICE VISIT (OUTPATIENT)
Dept: RHEUMATOLOGY | Age: 46
End: 2021-11-16
Payer: COMMERCIAL

## 2021-11-16 VITALS
DIASTOLIC BLOOD PRESSURE: 68 MMHG | WEIGHT: 191.4 LBS | BODY MASS INDEX: 28.26 KG/M2 | SYSTOLIC BLOOD PRESSURE: 116 MMHG | HEART RATE: 72 BPM

## 2021-11-16 DIAGNOSIS — Z79.899 HIGH RISK MEDICATION USE: ICD-10-CM

## 2021-11-16 DIAGNOSIS — M05.79 RHEUMATOID ARTHRITIS INVOLVING MULTIPLE SITES WITH POSITIVE RHEUMATOID FACTOR (HCC): ICD-10-CM

## 2021-11-16 DIAGNOSIS — M50.30 DDD (DEGENERATIVE DISC DISEASE), CERVICAL: ICD-10-CM

## 2021-11-16 DIAGNOSIS — M15.9 PRIMARY OSTEOARTHRITIS INVOLVING MULTIPLE JOINTS: ICD-10-CM

## 2021-11-16 DIAGNOSIS — M05.79 RHEUMATOID ARTHRITIS INVOLVING MULTIPLE SITES WITH POSITIVE RHEUMATOID FACTOR (HCC): Primary | ICD-10-CM

## 2021-11-16 LAB
ALT SERPL-CCNC: 26 U/L (ref 10–40)
AST SERPL-CCNC: 24 U/L (ref 15–37)
BASOPHILS ABSOLUTE: 0.1 K/UL (ref 0–0.2)
BASOPHILS RELATIVE PERCENT: 1.1 %
CREAT SERPL-MCNC: 1.2 MG/DL (ref 0.9–1.3)
EOSINOPHILS ABSOLUTE: 0.5 K/UL (ref 0–0.6)
EOSINOPHILS RELATIVE PERCENT: 8.4 %
GFR AFRICAN AMERICAN: >60
GFR NON-AFRICAN AMERICAN: >60
HCT VFR BLD CALC: 46.8 % (ref 40.5–52.5)
HEMOGLOBIN: 15.5 G/DL (ref 13.5–17.5)
LYMPHOCYTES ABSOLUTE: 2 K/UL (ref 1–5.1)
LYMPHOCYTES RELATIVE PERCENT: 35.8 %
MCH RBC QN AUTO: 31.1 PG (ref 26–34)
MCHC RBC AUTO-ENTMCNC: 33.2 G/DL (ref 31–36)
MCV RBC AUTO: 93.6 FL (ref 80–100)
MONOCYTES ABSOLUTE: 0.4 K/UL (ref 0–1.3)
MONOCYTES RELATIVE PERCENT: 7.7 %
NEUTROPHILS ABSOLUTE: 2.6 K/UL (ref 1.7–7.7)
NEUTROPHILS RELATIVE PERCENT: 47 %
PDW BLD-RTO: 13 % (ref 12.4–15.4)
PLATELET # BLD: 264 K/UL (ref 135–450)
PMV BLD AUTO: 8.6 FL (ref 5–10.5)
RBC # BLD: 4.99 M/UL (ref 4.2–5.9)
WBC # BLD: 5.6 K/UL (ref 4–11)

## 2021-11-16 PROCEDURE — 99214 OFFICE O/P EST MOD 30 MIN: CPT | Performed by: INTERNAL MEDICINE

## 2021-11-16 RX ORDER — CYCLOBENZAPRINE HCL 5 MG
TABLET ORAL
Qty: 90 TABLET | Refills: 5 | Status: SHIPPED | OUTPATIENT
Start: 2021-11-16 | End: 2022-05-10

## 2021-11-16 RX ORDER — ETANERCEPT 50 MG/ML
50 SOLUTION SUBCUTANEOUS WEEKLY
Qty: 4 EACH | Refills: 0 | Status: SHIPPED | OUTPATIENT
Start: 2021-11-16 | End: 2021-12-16

## 2021-11-16 NOTE — PROGRESS NOTES
2021  Patient Name: Prema Weber  : 1975  Medical Record: [id number]      ASSESSMENT AND PLAN    Assessment/Plan:      ASSESSMENT:    1. Rheumatoid arthritis involving multiple sites with positive rheumatoid factor ([facility] [address]    2. High risk medication use    3. Primary osteoarthritis involving multiple joints    4. DDD (degenerative disc disease), cervical        PLAN:     Ishaan Contreras was seen today for follow-up. Diagnoses and all orders for this visit:    Rheumatoid arthritis involving multiple sites with positive rheumatoid factor (HCC)  -     Etanercept (ENBREL MINI) 50 MG/ML SOCT; Inject 50 mg into the skin once a week  -     cyclobenzaprine (FLEXERIL) 5 MG tablet; Take 1 or 2 tabs at night. Can take 1 tab in am if tolerated    High risk medication use    Primary osteoarthritis involving multiple joints    DDD (degenerative disc disease), cervical  -     cyclobenzaprine (FLEXERIL) 5 MG tablet; Take 1 or 2 tabs at night. Can take 1 tab in am if tolerated  -     [facility]FL - Ashkan Balderrama MD, Pain Management, [facility]    History suggestive of inflammatory arthritis, soft tissue swelling in PIP joints,   RF, CCP positive-rheumatoid arthritis in symmetrical polyarticular distribution   also component of osteoarthritis contributing to the joint symptoms  Hand and foot x-rays with mild degenerative changes  No active synovitis on joint exam  Continue Enbrel 50 mg once a week  Will avoid methotrexate due to heavy alcohol use      Neck pain most likely due to degenerative disc disease. No warning signs or radiculopathy  Continues to be symptomatic  Advised to start physical therapy  Continue meloxicam 15 mg daily  Start Flexeril 5 to 10 mg at bedtime  Will refer to pain specialist    The patient indicates understanding of these issues and agrees with the plan. Return in about 4 months (around 3/16/2022).       The risks and benefits of my recommendations, as well as other treatment options, benefits and side effects werediscussed with the patient. All questions were answered. MEDICATIONS  Current Outpatient Medications   Medication Sig Dispense Refill    Etanercept (ENBREL MINI) 50 MG/ML SOCT Inject 50 mg into the skin once a week 4 each 0    cyclobenzaprine (FLEXERIL) 5 MG tablet Take 1 or 2 tabs at night. Can take 1 tab in am if tolerated 90 tablet 5    meloxicam (MOBIC) 15 MG tablet Take 1 tablet by mouth daily 30 tablet 5    acetaminophen (TYLENOL) 325 MG tablet Take 650 mg by mouth every 6 hours as needed for Pain.  albuterol (PROAIR HFA) 108 (90 BASE) MCG/ACT inhaler Inhale 2 puffs into the lungs See Admin Instructions. Use every 4-6 hours while awake for 7-10 days then 4- 6 hours PRN wheezing, cough and/or congestion. Dispense with SPACER and Instruct on use 1 Inhaler 1     No current facility-administered medications for this visit. ALLERGIES  No Known Allergies      Comments  No specialty comments available. Background history:  Ene Leavitt is a 55 y.o. male with no significant past medical history who is being seen for follow up evaluation of  joint pain. Symptoms started several years ago and have progressively gotten worse over the past 5 to 6 months. He has been in his hands, feet, elbows, wrists, knees and hips. He has pain on daily basis. Symptoms are worse in the hands. Pain waxes and wanes in severity. He describes his pain as aching, 5 out of 10 without any significant living or aggravating factors. He denies any swelling in the joints. Has a morning stiffness lasting for 2 to 3 hours. He has some difficulty opening doorknobs and jars. He was given naproxen which helped initially for 2 weeks then then stopped working. He now takes ibuprofen 800 mg twice a day and alternates it with Tylenol with some relief. He had a blood work by PCP that showed RF 31.4 and ESR 6. He has a neck and the low back pain for past 6 months. He denies any trauma.   He denies any radiation, tingling or numbness, weakness, bowel or bladder dysfunction or saddle anesthesia. He has stiffness in the low back in the morning lasting for up to 2 hours. Pain loosens up as a day progresses. He denies any relation to activity or rest.  He denies family history of rheumatoid arthritis. He denies psoriasis, inflammatory bowel disease, inflammatory back pain, dactylitis, enthesitis, tenosynovitis or uveitis. He denies fever, weight loss or swollen glands. He denies preceding viral infection, urinary tract infection, urinary discharge or diarrhea. Interim history: He presents for follow-up of rheumatoid arthritis and osteoarthritis. Blood work came back positive for rheumatoid arthritis. He is on Enbrel 50 mg subcu once a week. H He continues to have achiness in the joints. He denies any swelling in the joints. Morning stiffness last for less than 1 hour  He reports pain in his neck associated with stiffness. He denies any radiation, tingling or numbness, weakness, bowel or bladder dysfunction. Cervical spine x-ray showed changes consistent with mild degenerative arthritis. He saw a spine specialist.  He had MRI of the cervical spine that showED changes consistent with degenerative arthritis. He was referred to physical therapy but has not started it yet. He also takes meloxicam 15 mg daily with minimal benefit. He is not taking Flexeril anymore. Hand and foot x-rays show changes consistent with mild degenerative/osteoarthritis. He denies any side effects with Enbrel. He denies any recent fevers or infections. HPI  Review of Systems    REVIEW OF SYSTEMS:   Constitutional: No unanticipated weight loss or fevers.    Integumentary: No rash, photosensitivity, malar rash, livedo reticularis, alopecia and Raynaud's symptoms, sclerodactyly, skin tightening  Eyes: negative for visual disturbance and persistent redness, discharge from eyes   ENT: - No tinnitus, loss of hearing, vertigo, or recurrent ear infections.  - No history of nasal/oral ulcers. - No history of dry eyes/dry mouth  Cardiovascular: No history of pericarditis, chest pain or murmur or palpitations  Respiratory: No shortness of breath, cough or history of interstitial lung disease. No history of pleurisy. No history of tuberculosis or atypical infections. Gastrointestinal: No history of heart burn, dysphagia or esophageal dysmotility. No change in bowel habits or any inflammatory bowel disease. Genitourinary: No history renal disease, miscarriages. Hematologic/Lymphatic: No abnormal bruising or bleeding, blood clots or swollen lymph nodes. Neurological: No history of headaches, seizure or focal weakness. No history of neuropathies, paresthesias or hyperesthesias, facial droop, diplopia  Psychiatric: No history of bipolar disease, anxiety, depression  Endocrine: Denies any polyuria, polydipsia and osteoporosis  Allergic/Immunologic: No nasal congestion or hives. I have reviewed patients Past medical History, Social History and Family History as mentioned in her chart and this remains unchanged fromprevious.     Past Medical History:   Diagnosis Date    Pneumonia      Past Surgical History:   Procedure Laterality Date    HERNIA REPAIR Bilateral     inguinal at age 8     Social History     Socioeconomic History    Marital status:      Spouse name: Not on file    Number of children: Not on file    Years of education: Not on file    Highest education level: Not on file   Occupational History    Not on file   Tobacco Use    Smoking status: Current Every Day Smoker     Packs/day: 0.50     Types: Cigarettes    Smokeless tobacco: Never Used   Vaping Use    Vaping Use: Never used   Substance and Sexual Activity    Alcohol use: Yes     Comment: occas    Drug use: No    Sexual activity: Not on file   Other Topics Concern    Not on file   Social History Narrative    Not on file     Social Determinants of Health     Financial Resource Strain:     Difficulty of Paying Living Expenses: Not on file   Food Insecurity:     Worried About Running Out of Food in the Last Year: Not on file    Bola of Food in the Last Year: Not on file   Transportation Needs:     Lack of Transportation (Medical): Not on file    Lack of Transportation (Non-Medical): Not on file   Physical Activity:     Days of Exercise per Week: Not on file    Minutes of Exercise per Session: Not on file   Stress:     Feeling of Stress : Not on file   Social Connections:     Frequency of Communication with Friends and Family: Not on file    Frequency of Social Gatherings with Friends and Family: Not on file    Attends Mu-ism Services: Not on file    Active Member of [address] [place] [address] [employer] or Organizations: Not on file    Attends Club or Organization Meetings: Not on file    Marital Status: Not on file   Intimate Partner Violence:     Fear of Current or Ex-Partner: Not on file    Emotionally Abused: Not on file    Physically Abused: Not on file    Sexually Abused: Not on file   Housing Stability:     Unable to Pay for Housing in the Last Year: Not on file    Number of [place] in the Last Year: Not on file    Unstable Housing in the Last Year: Not on file     History reviewed. No pertinent family history.       PHYSICAL EXAM   Vitals:    11/16/21 1028   BP: 116/68   Pulse: 72   Weight: 191 lb 6.4 oz (86.8 kg)     Physical Exam  Constitutional:  Well developed, well nourished, no acute distress, non-toxic appearance   Musculoskeletal:    RIGHT  Swell  Tender  ROM  LEFT  Swell  Tender  ROM    DIP2  0  0   [place]  0  0   [place]   DIP3  0  0   [place]  0  0   [place]   DIP4  0  0   [place]  0  0   [place]   DIP5  0  0   [place]  0  0   [place]   PIP1  0  0  FULL   0  0  FULL    PIP2  0  + FULL   0  + FULL    PIP3  0  +  FULL   0  +  FULL    PIP4  0  +  FULL   0  +  FULL    PIP5  0  +  FULL   0  +  FULL    MCP1  0  0  FULL   0  0  FULL MCP2  0  0   bony change  0  0  FULL    MCP3  0  0   bony change  0  0  FULL    MCP4  0  0  FULL   0  0  FULL    MCP5  0  0  FULL   0  0  FULL    Wrist  0  0  FULL   0  0  FULL    Elbow  0  + FULL   0  + FULL    Shouldr  0  0  FULL   0  0  FULL    Hip  0  0  FULL   0  0  FULL    Knee  0  0   crepitus  0  0   crepitus   Ankle  0  0  FULL   0  0  FULL    MTP1  0  0   hallux valgus  0  0   hallux valgus   MTP2  0  0   bony change  0  0   bony change   MTP3  0  0   bony change  0  0   bony change   MTP4  0  0  FULL   0  0  FULL    MTP5  0  0  FULL   0  0  FULL    IP1  0  0  FULL   0  0  FULL    IP2  0  0  FULL   0  0  FULL    IP3  0  0  FULL   0  0  FULL    IP4  0  0  FULL   0  0  FULL    IP5  0  0  FULL   0 0 FULL       Squaring, tenderness and bony enlargement of bilateral CMC joints  Ambulates without assistance, normal gait  Neck: Full ROM, supple, mild tenderness  Back- no tenderness. Bilateral SLR negative  Eyes:  PERRL, extra ocular movements intact, conjunctiva normal   HEENT:  Atraumatic, normocephalic, external ears normal, oropharynx moist, no pharyngeal exudates. Respiratory:  No respiratory distress  GI:  Soft, nondistended, normal bowel sounds, nontender, noorganomegaly, no mass, no rebound, no guarding   :  No costovertebral angle tenderness   Integument:  Well hydrated, no rash or telangiectasias  Lymphatic:  No lymphadenopathy noted   Neurologic:   Alert & oriented x 3, CN 2-12 normal, no focal deficits noted. Sensations Intact. Muscle strength 5/5 proximallyand distally in upper and lower extremities.    Psychiatric:  Speech and behavior appropriate           LABS AND IMAGING  Outside data reviewed and in HPI    Lab Results   Component Value Date    WBC 7.0 08/17/2021    RBC 4.86 08/17/2021    HGB 15.6 08/17/2021    HCT 46.0 08/17/2021     08/17/2021    MCV 94.6 08/17/2021    MCH 32.0 08/17/2021    MCHC 33.8 08/17/2021    RDW 14.6 08/17/2021    LYMPHOPCT 25.4 08/17/2021    MONOPCT 11.7 08/17/2021    BASOPCT 1.1 08/17/2021    MONOSABS 0.8 08/17/2021    LYMPHSABS 1.8 08/17/2021    EOSABS 0.5 08/17/2021    BASOSABS 0.1 08/17/2021       Chemistry        Component Value Date/Time     06/14/2021 1157    K 4.3 06/14/2021 1157     06/14/2021 1157    CO2 23 06/14/2021 1157    BUN 13 06/14/2021 1157    CREATININE 1.1 08/17/2021 0839        Component Value Date/Time    CALCIUM 9.6 06/14/2021 1157    ALKPHOS 84 06/14/2021 1157    AST 23 08/17/2021 0839    ALT 31 08/17/2021 0839    BILITOT <0.2 06/14/2021 1157          Lab Results   Component Value Date    SEDRATE 6 06/14/2021     Lab Results   Component Value Date    CRP <3.0 06/14/2021     No results found for: GIULIA, LEXX, SSA, SSB, C3, C4  Lab Results   Component Value Date    RF 42.0 06/14/2021     No results found for: GIULIA, ANATITER, ANAINT, PATH  No results found for: DSDNAG, DSDNAIGGIFA  No results found for: SSAROAB, SSALAAB  No results found for: SMAB, RNPAB  No results found for: CENTABIGG  No results found for: C3, C4, ACE  No results found for: Sharmon Cassis, [id number]  No results found for: Juan C Govea  No results found for: Blade Achilles  Lab Results   Component Value Date    TSHFT4 1.28 06/14/2021     No results found for: VITD25    Labs reviewed 4/21/2021  RF 31.4  ESR 6  ######################################################################    I thank you for giving me theopportunity to participate in [address] [facility]. If you have any questions or concerns please feel free to contact me. I look forward to following  Francisco along with you. Electronically signed by: Janak Doe MD, MD, 11/16/2021 11:07 AM    Documentation was done using voice recognition dragon software. Every effort was made to ensure accuracy;however, inadvertent unintentional computerized transcription errors may be present.

## 2021-11-19 LAB
QUANTI TB GOLD PLUS: NEGATIVE
QUANTI TB1 MINUS NIL: 0 IU/ML (ref 0–0.34)
QUANTI TB2 MINUS NIL: 0 IU/ML (ref 0–0.34)
QUANTIFERON MITOGEN: >10 IU/ML
QUANTIFERON NIL: 0.01 IU/ML

## 2021-12-15 ENCOUNTER — TELEPHONE (OUTPATIENT)
Dept: RHEUMATOLOGY | Age: 46
End: 2021-12-15

## 2021-12-15 DIAGNOSIS — M05.79 RHEUMATOID ARTHRITIS INVOLVING MULTIPLE SITES WITH POSITIVE RHEUMATOID FACTOR (HCC): ICD-10-CM

## 2021-12-15 NOTE — TELEPHONE ENCOUNTER
Pt calling and states he has not heard from pain management office he was referred to. He tried to call and LM, but has not heard back. Confirmed number with him. Please reach out to office he was referred to and make sure referral was received. Pt will call back if he cannot get a hold of the office.

## 2021-12-16 RX ORDER — ETANERCEPT 50 MG/ML
SOLUTION SUBCUTANEOUS
Qty: 4 EACH | Refills: 0 | Status: SHIPPED | OUTPATIENT
Start: 2021-12-16 | End: 2022-02-09

## 2022-03-14 ENCOUNTER — OFFICE VISIT (OUTPATIENT)
Dept: RHEUMATOLOGY | Age: 47
End: 2022-03-14
Payer: COMMERCIAL

## 2022-03-14 VITALS
SYSTOLIC BLOOD PRESSURE: 138 MMHG | BODY MASS INDEX: 30.57 KG/M2 | WEIGHT: 207 LBS | HEART RATE: 77 BPM | DIASTOLIC BLOOD PRESSURE: 88 MMHG

## 2022-03-14 DIAGNOSIS — M50.30 DDD (DEGENERATIVE DISC DISEASE), CERVICAL: ICD-10-CM

## 2022-03-14 DIAGNOSIS — M15.9 PRIMARY OSTEOARTHRITIS INVOLVING MULTIPLE JOINTS: ICD-10-CM

## 2022-03-14 DIAGNOSIS — M05.79 RHEUMATOID ARTHRITIS INVOLVING MULTIPLE SITES WITH POSITIVE RHEUMATOID FACTOR (HCC): Primary | ICD-10-CM

## 2022-03-14 DIAGNOSIS — Z79.899 HIGH RISK MEDICATION USE: ICD-10-CM

## 2022-03-14 LAB
ALT SERPL-CCNC: 36 U/L (ref 10–40)
AST SERPL-CCNC: 22 U/L (ref 15–37)
BASOPHILS ABSOLUTE: 0.1 K/UL (ref 0–0.2)
BASOPHILS RELATIVE PERCENT: 1 %
CREAT SERPL-MCNC: 1.3 MG/DL (ref 0.9–1.3)
EOSINOPHILS ABSOLUTE: 0.2 K/UL (ref 0–0.6)
EOSINOPHILS RELATIVE PERCENT: 3.8 %
GFR AFRICAN AMERICAN: >60
GFR NON-AFRICAN AMERICAN: 59
HCT VFR BLD CALC: 45.1 % (ref 40.5–52.5)
HEMOGLOBIN: 15.2 G/DL (ref 13.5–17.5)
LYMPHOCYTES ABSOLUTE: 1.7 K/UL (ref 1–5.1)
LYMPHOCYTES RELATIVE PERCENT: 31.5 %
MCH RBC QN AUTO: 30.9 PG (ref 26–34)
MCHC RBC AUTO-ENTMCNC: 33.8 G/DL (ref 31–36)
MCV RBC AUTO: 91.4 FL (ref 80–100)
MONOCYTES ABSOLUTE: 0.7 K/UL (ref 0–1.3)
MONOCYTES RELATIVE PERCENT: 12.6 %
NEUTROPHILS ABSOLUTE: 2.7 K/UL (ref 1.7–7.7)
NEUTROPHILS RELATIVE PERCENT: 51.1 %
PDW BLD-RTO: 13.5 % (ref 12.4–15.4)
PLATELET # BLD: 237 K/UL (ref 135–450)
PMV BLD AUTO: 8.2 FL (ref 5–10.5)
RBC # BLD: 4.94 M/UL (ref 4.2–5.9)
WBC # BLD: 5.2 K/UL (ref 4–11)

## 2022-03-14 PROCEDURE — 99214 OFFICE O/P EST MOD 30 MIN: CPT | Performed by: INTERNAL MEDICINE

## 2022-03-14 RX ORDER — MELOXICAM 15 MG/1
15 TABLET ORAL DAILY
Qty: 30 TABLET | Refills: 5 | Status: SHIPPED | OUTPATIENT
Start: 2022-03-14

## 2022-03-14 RX ORDER — GABAPENTIN 300 MG/1
CAPSULE ORAL
Qty: 90 CAPSULE | Refills: 3 | Status: SHIPPED | OUTPATIENT
Start: 2022-03-14 | End: 2022-04-19 | Stop reason: SINTOL

## 2022-03-14 NOTE — PROGRESS NOTES
3/14/2022  Patient Name: Neagr Weber  : 1975  Medical Record: [id number]      ASSESSMENT AND PLAN    Assessment/Plan:      ASSESSMENT:    1. Rheumatoid arthritis involving multiple sites with positive rheumatoid factor ([facility] [address]    2. Primary osteoarthritis involving multiple joints    3. DDD (degenerative disc disease), cervical    4. High risk medication use        PLAN:     Radha Whitten was seen today for follow-up. Diagnoses and all orders for this visit:    Rheumatoid arthritis involving multiple sites with positive rheumatoid factor (HCC)    Primary osteoarthritis involving multiple joints  -     meloxicam (MOBIC) 15 MG tablet; Take 1 tablet by mouth daily    DDD (degenerative disc disease), cervical  -     meloxicam (MOBIC) 15 MG tablet; Take 1 tablet by mouth daily  -     gabapentin (NEURONTIN) 300 MG capsule; Take 300 mg daily by mouth at bedtime for 4 days, then twice a day for 4 days and then 3 times a day    High risk medication use    Rheumatoid arthritis -history suggestive of inflammatory arthritis, soft tissue swelling in PIP joints,   RF, CCP positive-rheumatoid arthritis in symmetrical polyarticular distribution   also component of osteoarthritis contributing to the joint symptoms  Hand and foot x-rays with mild degenerative changes  No active synovitis on joint exam  Continue Enbrel 50 mg once a week  Will avoid methotrexate due to heavy alcohol use      Degenerative disc disease cervical spine -neck pain most likely due to degenerative disc disease. No warning signs or radiculopathy  Continues to be symptomatic  Advised to continue physical therapy and meloxicam 15 mg daily  Excessive drowsiness on Flexeril  Start gabapentin 300 mg 3 times a day.   Advised to skip a.m. and afternoon dose and take 600 mg at bedtime if excessive drowsiness  Continue follow-up with a pain specialist      Side effects of gabapentin include weight gain, dizziness, drowsiness, dry mouth and cognitive impairment and were explained to the patient      The patient indicates understanding of these issues and agrees with the plan. Return in about 4 months (around 7/14/2022). The risks and benefits of my recommendations, as well as other treatment options, benefits and side effects werediscussed with the patient. All questions were answered. MEDICATIONS  Current Outpatient Medications   Medication Sig Dispense Refill    meloxicam (MOBIC) 15 MG tablet Take 1 tablet by mouth daily 30 tablet 5    gabapentin (NEURONTIN) 300 MG capsule Take 300 mg daily by mouth at bedtime for 4 days, then twice a day for 4 days and then 3 times a day 90 capsule 3    ENBREL MINI 50 MG/ML SOCT INJECT 50MG SUBCUTANEOUSLY ONCE WEEKLY USING THE AUTO TOUCH DEVICE AS DIRECTED. 4 mL 5    cyclobenzaprine (FLEXERIL) 5 MG tablet Take 1 or 2 tabs at night. Can take 1 tab in am if tolerated 90 tablet 5    acetaminophen (TYLENOL) 325 MG tablet Take 650 mg by mouth every 6 hours as needed for Pain.  albuterol (PROAIR HFA) 108 (90 BASE) MCG/ACT inhaler Inhale 2 puffs into the lungs See Admin Instructions. Use every 4-6 hours while awake for 7-10 days then 4- 6 hours PRN wheezing, cough and/or congestion. Dispense with SPACER and Instruct on use 1 Inhaler 1     No current facility-administered medications for this visit. ALLERGIES  No Known Allergies      Comments  No specialty comments available. Background history:  Mckenzie Bettencourt is a 55 y.o. male with no significant past medical history who is being seen for follow up evaluation of  joint pain. Symptoms started several years ago and have progressively gotten worse over the past 5 to 6 months. He has been in his hands, feet, elbows, wrists, knees and hips. He has pain on daily basis. Symptoms are worse in the hands. Pain waxes and wanes in severity. He describes his pain as aching, 5 out of 10 without any significant living or aggravating factors.   He denies any swelling in the joints. Has a morning stiffness lasting for 2 to 3 hours. He has some difficulty opening doorknobs and jars. He was given naproxen which helped initially for 2 weeks then then stopped working. He now takes ibuprofen 800 mg twice a day and alternates it with Tylenol with some relief. He had a blood work by PCP that showed RF 31.4 and ESR 6. He has a neck and the low back pain for past 6 months. He denies any trauma. He denies any radiation, tingling or numbness, weakness, bowel or bladder dysfunction or saddle anesthesia. He has stiffness in the low back in the morning lasting for up to 2 hours. Pain loosens up as a day progresses. He denies any relation to activity or rest.  He denies family history of rheumatoid arthritis. He denies psoriasis, inflammatory bowel disease, inflammatory back pain, dactylitis, enthesitis, tenosynovitis or uveitis. He denies fever, weight loss or swollen glands. He denies preceding viral infection, urinary tract infection, urinary discharge or diarrhea. Interim history: He presents for follow-up of rheumatoid arthritis and osteoarthritis. Blood work came back positive for rheumatoid arthritis. He is on Enbrel 50 mg subcu once a week. H He continues to have achiness in the joints. He denies any swelling in the joints. Morning stiffness last for less than 1 hour  He reports pain in his neck associated with stiffness. He denies any radiation, tingling or numbness, weakness, bowel or bladder dysfunction. Cervical spine x-ray showed changes consistent with mild degenerative arthritis. He saw a spine specialist.  He had MRI of the cervical spine that showED changes consistent with degenerative arthritis. He has started physical therapy with some relief. He also takes meloxicam 15 mg daily with minimal benefit. He gets excessively drowsy with Flexeril. He was given tens units by pain specialist with minimal benefit.     Hand and foot x-rays show changes consistent with mild degenerative/osteoarthritis. He denies any side effects with Enbrel. He denies any recent fevers or infections. HPI  Review of Systems    REVIEW OF SYSTEMS:   Constitutional: No unanticipated weight loss or fevers. Integumentary: No rash, photosensitivity, malar rash, livedo reticularis, alopecia and Raynaud's symptoms, sclerodactyly, skin tightening  Eyes: negative for visual disturbance and persistent redness, discharge from eyes   ENT: - No tinnitus, loss of hearing, vertigo, or recurrent ear infections.  - No history of nasal/oral ulcers. - No history of dry eyes/dry mouth  Cardiovascular: No history of pericarditis, chest pain or murmur or palpitations  Respiratory: No shortness of breath, cough or history of interstitial lung disease. No history of pleurisy. No history of tuberculosis or atypical infections. Gastrointestinal: No history of heart burn, dysphagia or esophageal dysmotility. No change in bowel habits or any inflammatory bowel disease. Genitourinary: No history renal disease, miscarriages. Hematologic/Lymphatic: No abnormal bruising or bleeding, blood clots or swollen lymph nodes. Neurological: No history of headaches, seizure or focal weakness. No history of neuropathies, paresthesias or hyperesthesias, facial droop, diplopia  Psychiatric: No history of bipolar disease, anxiety, depression  Endocrine: Denies any polyuria, polydipsia and osteoporosis  Allergic/Immunologic: No nasal congestion or hives. I have reviewed patients Past medical History, Social History and Family History as mentioned in her chart and this remains unchanged fromprevious.     Past Medical History:   Diagnosis Date    Pneumonia      Past Surgical History:   Procedure Laterality Date    HERNIA REPAIR Bilateral     inguinal at age 8     Social History     Socioeconomic History    Marital status:      Spouse name: Not on file    Number of children: Not on file    Years of education: Not on file    Highest education level: Not on file   Occupational History    Not on file   Tobacco Use    Smoking status: Current Every Day Smoker     Packs/day: 0.50     Types: Cigarettes    Smokeless tobacco: Never Used   Vaping Use    Vaping Use: Never used   Substance and Sexual Activity    Alcohol use: Yes     Comment: occas    Drug use: No    Sexual activity: Not on file   Other Topics Concern    Not on file   Social History Narrative    Not on file     Social Determinants of Health     Financial Resource Strain:     Difficulty of Paying Living Expenses: Not on file   Food Insecurity:     Worried About Running Out of Food in the Last Year: Not on file    Bola of Food in the Last Year: Not on file   Transportation Needs:     Lack of Transportation (Medical): Not on file    Lack of Transportation (Non-Medical): Not on file   Physical Activity:     Days of Exercise per Week: Not on file    Minutes of Exercise per Session: Not on file   Stress:     Feeling of Stress : Not on file   Social Connections:     Frequency of Communication with Friends and Family: Not on file    Frequency of Social Gatherings with Friends and Family: Not on file    Attends Rastafari Services: Not on file    Active Member of [address] [place] [address] [employer] or Organizations: Not on file    Attends Club or Organization Meetings: Not on file    Marital Status: Not on file   Intimate Partner Violence:     Fear of Current or Ex-Partner: Not on file    Emotionally Abused: Not on file    Physically Abused: Not on file    Sexually Abused: Not on file   Housing Stability:     Unable to Pay for Housing in the Last Year: Not on file    Number of [place] in the Last Year: Not on file    Unstable Housing in the Last Year: Not on file     History reviewed. No pertinent family history.       PHYSICAL EXAM   Vitals:    03/14/22 0812   BP: 138/88   Pulse: 77   Weight: 207 lb (93.9 kg)     Physical Exam  Constitutional:  Well developed, well nourished, no acute distress, non-toxic appearance   Musculoskeletal:    RIGHT  Swell  Tender  ROM  LEFT  Swell  Tender  ROM    DIP2  0  0   [place]  0  0   [place]   DIP3  0  0   [place]  0  0   [place]   DIP4  0  0   [place]  0  0   [place]   DIP5  0  0   [place]  0  0   [place]   PIP1  0  0  FULL   0  0  FULL    PIP2  0  + FULL   0  + FULL    PIP3  0  +  FULL   0  +  FULL    PIP4  0  +  FULL   0  +  FULL    PIP5  0  +  FULL   0  +  FULL    MCP1  0  0  FULL   0  0  FULL    MCP2  0  0   bony change  0  0  FULL    MCP3  0  0   bony change  0  0  FULL    MCP4  0  0  FULL   0  0  FULL    MCP5  0  0  FULL   0  0  FULL    Wrist  0  0  FULL   0  0  FULL    Elbow  0  + FULL   0  + FULL    Shouldr  0  0  FULL   0  0  FULL    Hip  0  0  FULL   0  0  FULL    Knee  0  0   crepitus  0  0   crepitus   Ankle  0  0  FULL   0  0  FULL    MTP1  0  0   hallux valgus  0  0   hallux valgus   MTP2  0  0   bony change  0  0   bony change   MTP3  0  0   bony change  0  0   bony change   MTP4  0  0  FULL   0  0  FULL    MTP5  0  0  FULL   0  0  FULL    IP1  0  0  FULL   0  0  FULL    IP2  0  0  FULL   0  0  FULL    IP3  0  0  FULL   0  0  FULL    IP4  0  0  FULL   0  0  FULL    IP5  0  0  FULL   0 0 FULL       Squaring, tenderness and bony enlargement of bilateral CMC joints  Ambulates without assistance, normal gait  Neck: Full ROM, supple, mild tenderness  Back- no tenderness. Bilateral SLR negative  Eyes:  PERRL, extra ocular movements intact, conjunctiva normal   HEENT:  Atraumatic, normocephalic, external ears normal, oropharynx moist, no pharyngeal exudates.    Respiratory:  No respiratory distress  GI:  Soft, nondistended, normal bowel sounds, nontender, noorganomegaly, no mass, no rebound, no guarding   :  No costovertebral angle tenderness   Integument:  Well hydrated, no rash or telangiectasias  Lymphatic:  No lymphadenopathy noted   Neurologic:   Alert & oriented x 3, CN 2-12 normal, no focal deficits noted. Sensations Intact. Muscle strength 5/5 proximallyand distally in upper and lower extremities. Psychiatric:  Speech and behavior appropriate           LABS AND IMAGING  Outside data reviewed and in HPI    Lab Results   Component Value Date    WBC 5.6 11/16/2021    RBC 4.99 11/16/2021    HGB 15.5 11/16/2021    HCT 46.8 11/16/2021     11/16/2021    MCV 93.6 11/16/2021    MCH 31.1 11/16/2021    MCHC 33.2 11/16/2021    RDW 13.0 11/16/2021    LYMPHOPCT 35.8 11/16/2021    MONOPCT 7.7 11/16/2021    BASOPCT 1.1 11/16/2021    MONOSABS 0.4 11/16/2021    LYMPHSABS 2.0 11/16/2021    EOSABS 0.5 11/16/2021    BASOSABS 0.1 11/16/2021       Chemistry        Component Value Date/Time     06/14/2021 1157    K 4.3 06/14/2021 1157     06/14/2021 1157    CO2 23 06/14/2021 1157    BUN 13 06/14/2021 1157    CREATININE 1.2 11/16/2021 1117        Component Value Date/Time    CALCIUM 9.6 06/14/2021 1157    ALKPHOS 84 06/14/2021 1157    AST 24 11/16/2021 1117    ALT 26 11/16/2021 1117    BILITOT <0.2 06/14/2021 1157          Lab Results   Component Value Date    SEDRATE 6 06/14/2021     Lab Results   Component Value Date    CRP <3.0 06/14/2021     No results found for: GIULIA, LEXX, SSA, SSB, C3, C4  Lab Results   Component Value Date    RF 42.0 06/14/2021     No results found for: GIULIA, ANATITER, ANAINT, PATH  No results found for: DSDNAG, DSDNAIGGIFA  No results found for: Geoff Lessen  No results found for: SMAB, RNPAB  No results found for: CENTABIGG  No results found for: C3, C4, ACE  No results found for: Nicolasa Gey, [id number]  No results found for: Daylene Alex  No results found for: [id number]  Lab Results   Component Value Date    TSHFT4 1.28 06/14/2021     No results found for: VITD25    Labs reviewed 4/21/2021  RF 31.4  ESR 6  ######################################################################    I thank you for giving me theopportunity to participate in [address] care.  If you have any questions or concerns please feel free to contact me. I look forward to following  Francisco along with you. Electronically signed by: Darrion Sauceda MD, MD, 3/14/2022 8:43 AM    Documentation was done using voice recognition dragon software. Every effort was made to ensure accuracy;however, inadvertent unintentional computerized transcription errors may be present.

## 2022-03-15 DIAGNOSIS — R79.89 ELEVATED SERUM CREATININE: Primary | ICD-10-CM

## 2022-04-12 DIAGNOSIS — R79.89 ELEVATED SERUM CREATININE: ICD-10-CM

## 2022-04-12 LAB
ANION GAP SERPL CALCULATED.3IONS-SCNC: 20 MMOL/L (ref 3–16)
BUN BLDV-MCNC: 17 MG/DL (ref 7–20)
CALCIUM SERPL-MCNC: 9.7 MG/DL (ref 8.3–10.6)
CHLORIDE BLD-SCNC: 99 MMOL/L (ref 99–110)
CO2: 20 MMOL/L (ref 21–32)
CREAT SERPL-MCNC: 1.1 MG/DL (ref 0.9–1.3)
GFR AFRICAN AMERICAN: >60
GFR NON-AFRICAN AMERICAN: >60
GLUCOSE BLD-MCNC: 162 MG/DL (ref 70–99)
POTASSIUM SERPL-SCNC: 4.2 MMOL/L (ref 3.5–5.1)
SODIUM BLD-SCNC: 139 MMOL/L (ref 136–145)

## 2022-04-19 ENCOUNTER — TELEPHONE (OUTPATIENT)
Dept: RHEUMATOLOGY | Age: 47
End: 2022-04-19

## 2022-04-19 NOTE — TELEPHONE ENCOUNTER
Pt called stating that he was given gabapentin. He is reported having horrible side effects. He is having terrible fatigue and nightmares. He is not able to take the medication. He has stopped the medication and the side effects are getting better. It didn't really help with his pain anyway. Not sure of the Md could recommend something else. He has been on Enbrel since September and he is still not seeing any change at all. He reports he has more ailments now than when he started the medication. He is feeling this needs to be changed as well. Asking for  to advise.

## 2022-04-19 NOTE — TELEPHONE ENCOUNTER
Please call the patient and let him know that he should taper off gabapentin if he is not tolerating it. He can decrease gabapentin by 1 capsule every 3 to 4 days until its gone. She should continue taking Enbrel. To discuss other treatment options she should make a sooner follow-up appointment.

## 2022-04-28 ENCOUNTER — TELEPHONE (OUTPATIENT)
Dept: RHEUMATOLOGY | Age: 47
End: 2022-04-28

## 2022-04-28 NOTE — TELEPHONE ENCOUNTER
Patient called in stating the the pharmacy can not fill the diclofenac that was given to him by Dr. Ana Rosa Whitehead because it is in the same class as the meloxicam. Wants to know if he can stop the Meloxicam? What what he should do. Please advise.

## 2022-05-10 ENCOUNTER — OFFICE VISIT (OUTPATIENT)
Dept: RHEUMATOLOGY | Age: 47
End: 2022-05-10
Payer: COMMERCIAL

## 2022-05-10 ENCOUNTER — TELEPHONE (OUTPATIENT)
Dept: ADMINISTRATIVE | Age: 47
End: 2022-05-10

## 2022-05-10 VITALS — BODY MASS INDEX: 30.78 KG/M2 | SYSTOLIC BLOOD PRESSURE: 142 MMHG | DIASTOLIC BLOOD PRESSURE: 84 MMHG | WEIGHT: 208.4 LBS

## 2022-05-10 DIAGNOSIS — M50.30 DDD (DEGENERATIVE DISC DISEASE), CERVICAL: ICD-10-CM

## 2022-05-10 DIAGNOSIS — M05.79 RHEUMATOID ARTHRITIS INVOLVING MULTIPLE SITES WITH POSITIVE RHEUMATOID FACTOR (HCC): Primary | ICD-10-CM

## 2022-05-10 DIAGNOSIS — M15.9 PRIMARY OSTEOARTHRITIS INVOLVING MULTIPLE JOINTS: ICD-10-CM

## 2022-05-10 DIAGNOSIS — Z79.899 HIGH RISK MEDICATION USE: ICD-10-CM

## 2022-05-10 PROCEDURE — 99214 OFFICE O/P EST MOD 30 MIN: CPT | Performed by: INTERNAL MEDICINE

## 2022-05-10 RX ORDER — METHOCARBAMOL 500 MG/1
1 TABLET, FILM COATED ORAL EVERY 8 HOURS PRN
COMMUNITY
Start: 2022-04-27

## 2022-05-10 RX ORDER — ADALIMUMAB 40MG/0.4ML
KIT SUBCUTANEOUS
Qty: 2 EACH | Refills: 5 | Status: SHIPPED | OUTPATIENT
Start: 2022-05-10

## 2022-05-10 NOTE — PROGRESS NOTES
5/10/2022  Patient Name: Wale Weber  : 1975  Medical Record: [id number]      ASSESSMENT AND PLAN    Assessment/Plan:      ASSESSMENT:    1. Rheumatoid arthritis involving multiple sites with positive rheumatoid factor ([facility] [address]    2. Primary osteoarthritis involving multiple joints    3. DDD (degenerative disc disease), cervical    4. High risk medication use        PLAN:     Ankush Walker was seen today for follow-up. Diagnoses and all orders for this visit:    Rheumatoid arthritis involving multiple sites with positive rheumatoid factor (HCC)  -     Adalimumab (HUMIRA PEN) 40 MG/0.4ML PNKT; Inject 40 mg sq every 2 weeks    Primary osteoarthritis involving multiple joints    DDD (degenerative disc disease), cervical    High risk medication use    Rheumatoid arthritis -history suggestive of inflammatory arthritis, soft tissue swelling in PIP joints,   RF, CCP positive-rheumatoid arthritis in symmetrical polyarticular distribution   also component of osteoarthritis contributing to the joint symptoms  Hand and foot x-rays with mild degenerative changes  No active synovitis on joint exam  Developed muscle spasms on Enbrel   I will discontinue Enbrel and start Humira 40 mg subcu every 2 weeks  Will avoid methotrexate due to heavy alcohol use      Degenerative disc disease cervical spine -neck pain most likely due to degenerative disc disease.   No warning signs or radiculopathy  Continues to be symptomatic  Advised to continue physical therapy and meloxicam 15 mg daily  Excessive drowsiness on Flexeril and nightmares on gabapentin  Continue follow-up with a pain specialist    High risk medication use-  TNF INHIBITORS can cause increased risk of TB reactivation, oppurtunistic infections, lupus like illness, rash, hypersensitivity reaction, infusion reactions, demyelinating disease and possible risk of malignancies and lung diseases and were explained to the patient  Recommend flu and pneumonia vaccine  The patient indicates understanding of these issues and agrees with the plan. Return in about 8 weeks (around 7/5/2022). The risks and benefits of my recommendations, as well as other treatment options, benefits and side effects werediscussed with the patient. All questions were answered. MEDICATIONS  Current Outpatient Medications   Medication Sig Dispense Refill    methocarbamol (ROBAXIN) 500 MG tablet Take 1 tablet by mouth every 8 hours as needed      Adalimumab (HUMIRA PEN) 40 MG/0.4ML PNKT Inject 40 mg sq every 2 weeks 2 each 5    meloxicam (MOBIC) 15 MG tablet Take 1 tablet by mouth daily 30 tablet 5    acetaminophen (TYLENOL) 325 MG tablet Take 650 mg by mouth every 6 hours as needed for Pain.  albuterol (PROAIR HFA) 108 (90 BASE) MCG/ACT inhaler Inhale 2 puffs into the lungs See Admin Instructions. Use every 4-6 hours while awake for 7-10 days then 4- 6 hours PRN wheezing, cough and/or congestion. Dispense with SPACER and Instruct on use 1 Inhaler 1     No current facility-administered medications for this visit. ALLERGIES  No Known Allergies      Comments  No specialty comments available. Background history:  Annmarie Ramirez is a 55 y.o. male with no significant past medical history who is being seen for follow up evaluation of  joint pain. Symptoms started several years ago and have progressively gotten worse over the past 5 to 6 months. He has been in his hands, feet, elbows, wrists, knees and hips. He has pain on daily basis. Symptoms are worse in the hands. Pain waxes and wanes in severity. He describes his pain as aching, 5 out of 10 without any significant living or aggravating factors. He denies any swelling in the joints. Has a morning stiffness lasting for 2 to 3 hours. He has some difficulty opening doorknobs and jars. He was given naproxen which helped initially for 2 weeks then then stopped working.   He now takes ibuprofen 800 mg twice a day and alternates it with Tylenol with some relief. He had a blood work by PCP that showed RF 31.4 and ESR 6. He has a neck and the low back pain for past 6 months. He denies any trauma. He denies any radiation, tingling or numbness, weakness, bowel or bladder dysfunction or saddle anesthesia. He has stiffness in the low back in the morning lasting for up to 2 hours. Pain loosens up as a day progresses. He denies any relation to activity or rest.  He denies family history of rheumatoid arthritis. He denies psoriasis, inflammatory bowel disease, inflammatory back pain, dactylitis, enthesitis, tenosynovitis or uveitis. He denies fever, weight loss or swollen glands. He denies preceding viral infection, urinary tract infection, urinary discharge or diarrhea. Interim history: He presents for follow-up of rheumatoid arthritis and osteoarthritis. Blood work came back positive for rheumatoid arthritis. He is on Enbrel 50 mg subcu once a week. He continues to have achiness in the joints. He denies any swelling in the joints. Morning stiffness last for few hours. He stopped taking Enbrel 10 days ago. He did not notice any significant improvement in pain on Enbrel. He also had muscle spasms which he attributed to Enbrel. He stopped taking Enbrel and started taking magnesium supplement and his muscle spasms improved. He reports pain in his neck associated with stiffness. He denies any radiation, tingling or numbness, weakness, bowel or bladder dysfunction. Cervical spine x-ray showed changes consistent with mild degenerative arthritis. He had MRI of the cervical spine that showED changes consistent with degenerative arthritis. He did physical therapy with some relief. He also takes meloxicam 15 mg daily with minimal benefit. He gets excessively drowsy with Flexeril. He was placed on gabapentin which caused nightmares. He was given tens units by pain specialist with minimal benefit.   He is scheduled to see pain specialist again. Hand and foot x-rays show changes consistent with mild degenerative/osteoarthritis. He denies any recent fevers or infections. HPI  Review of Systems    REVIEW OF SYSTEMS:   Constitutional: No unanticipated weight loss or fevers. Integumentary: No rash, photosensitivity, malar rash, livedo reticularis, alopecia and Raynaud's symptoms, sclerodactyly, skin tightening  Eyes: negative for visual disturbance and persistent redness, discharge from eyes   ENT: - No tinnitus, loss of hearing, vertigo, or recurrent ear infections.  - No history of nasal/oral ulcers. - No history of dry eyes/dry mouth  Cardiovascular: No history of pericarditis, chest pain or murmur or palpitations  Respiratory: No shortness of breath, cough or history of interstitial lung disease. No history of pleurisy. No history of tuberculosis or atypical infections. Gastrointestinal: No history of heart burn, dysphagia or esophageal dysmotility. No change in bowel habits or any inflammatory bowel disease. Genitourinary: No history renal disease, miscarriages. Hematologic/Lymphatic: No abnormal bruising or bleeding, blood clots or swollen lymph nodes. Neurological: No history of headaches, seizure or focal weakness. No history of neuropathies, paresthesias or hyperesthesias, facial droop, diplopia  Psychiatric: No history of bipolar disease, anxiety, depression  Endocrine: Denies any polyuria, polydipsia and osteoporosis  Allergic/Immunologic: No nasal congestion or hives. I have reviewed patients Past medical History, Social History and Family History as mentioned in her chart and this remains unchanged fromprevious.     Past Medical History:   Diagnosis Date    Pneumonia      Past Surgical History:   Procedure Laterality Date    HERNIA REPAIR Bilateral     inguinal at age 8     Social History     Socioeconomic History    Marital status:      Spouse name: Not on file    Number of children: Not on file  Years of education: Not on file    Highest education level: Not on file   Occupational History    Not on file   Tobacco Use    Smoking status: Current Every Day Smoker     Packs/day: 0.50     Types: Cigarettes    Smokeless tobacco: Never Used   Vaping Use    Vaping Use: Never used   Substance and Sexual Activity    Alcohol use: Yes     Comment: occas    Drug use: No    Sexual activity: Not on file   Other Topics Concern    Not on file   Social History Narrative    Not on file     Social Determinants of Health     Financial Resource Strain:     Difficulty of Paying Living Expenses: Not on file   Food Insecurity:     Worried About Running Out of Food in the Last Year: Not on file    Bola of Food in the Last Year: Not on file   Transportation Needs:     Lack of Transportation (Medical): Not on file    Lack of Transportation (Non-Medical): Not on file   Physical Activity:     Days of Exercise per Week: Not on file    Minutes of Exercise per Session: Not on file   Stress:     Feeling of Stress : Not on file   Social Connections:     Frequency of Communication with Friends and Family: Not on file    Frequency of Social Gatherings with Friends and Family: Not on file    Attends Confucianism Services: Not on file    Active Member of [address] [place] [address] or Organizations: Not on file    Attends Club or Organization Meetings: Not on file    Marital Status: Not on file   Intimate Partner Violence:     Fear of Current or Ex-Partner: Not on file    Emotionally Abused: Not on file    Physically Abused: Not on file    Sexually Abused: Not on file   Housing Stability:     Unable to Pay for Housing in the Last Year: Not on file    Number of [place] in the Last Year: Not on file    Unstable Housing in the Last Year: Not on file     No family history on file.       PHYSICAL EXAM   Vitals:    05/10/22 0841   BP: (!) 142/84   Site: Right Upper Arm   Position: Sitting   Cuff Size: Medium Adult   Weight: 208 lb 6.4 oz (94.5 kg)     Physical Exam  Constitutional:  Well developed, well nourished, no acute distress, non-toxic appearance   Musculoskeletal:    RIGHT  Swell  Tender  ROM  LEFT  Swell  Tender  ROM    DIP2  0  0   [place]  0  0   [place]   DIP3  0  0   [place]  0  0   [place]   DIP4  0  0   [place]  0  0   [place]   DIP5  0  0   [place]  0  0   [place]   PIP1  0  0  FULL   0  0  FULL    PIP2  0  + FULL   0  + FULL    PIP3  0  +  FULL   0  +  FULL    PIP4  0  +  FULL   0  +  FULL    PIP5  0  +  FULL   0  +  FULL    MCP1  0  0  FULL   0  0  FULL    MCP2  0  0   bony change  0  0  FULL    MCP3  0  0   bony change  0  0  FULL    MCP4  0  0  FULL   0  0  FULL    MCP5  0  0  FULL   0  0  FULL    Wrist  0  0  FULL   0  0  FULL    Elbow  0  + FULL   0  + FULL    Shouldr  0  0  FULL   0  0  FULL    Hip  0  0  FULL   0  0  FULL    Knee  0  0   crepitus  0  0   crepitus   Ankle  0  0  FULL   0  0  FULL    MTP1  0  0   hallux valgus  0  0   hallux valgus   MTP2  0  0   bony change  0  0   bony change   MTP3  0  0   bony change  0  0   bony change   MTP4  0  0  FULL   0  0  FULL    MTP5  0  0  FULL   0  0  FULL    IP1  0  0  FULL   0  0  FULL    IP2  0  0  FULL   0  0  FULL    IP3  0  0  FULL   0  0  FULL    IP4  0  0  FULL   0  0  FULL    IP5  0  0  FULL   0 0 FULL       Squaring, tenderness and bony enlargement of bilateral CMC joints  Ambulates without assistance, normal gait  Neck: Full ROM, supple, mild tenderness  Back- no tenderness. Bilateral SLR negative  Eyes:  PERRL, extra ocular movements intact, conjunctiva normal   HEENT:  Atraumatic, normocephalic, external ears normal, oropharynx moist, no pharyngeal exudates.    Respiratory:  No respiratory distress  GI:  Soft, nondistended, normal bowel sounds, nontender, noorganomegaly, no mass, no rebound, no guarding   :  No costovertebral angle tenderness   Integument:  Well hydrated, no rash or telangiectasias  Lymphatic:  No lymphadenopathy noted   Neurologic: Alert & oriented x 3, CN 2-12 normal, no focal deficits noted. Sensations Intact. Muscle strength 5/5 proximallyand distally in upper and lower extremities.    Psychiatric:  Speech and behavior appropriate           LABS AND IMAGING  Outside data reviewed and in HPI    Lab Results   Component Value Date    WBC 5.2 03/14/2022    RBC 4.94 03/14/2022    HGB 15.2 03/14/2022    HCT 45.1 03/14/2022     03/14/2022    MCV 91.4 03/14/2022    MCH 30.9 03/14/2022    MCHC 33.8 03/14/2022    RDW 13.5 03/14/2022    LYMPHOPCT 31.5 03/14/2022    MONOPCT 12.6 03/14/2022    BASOPCT 1.0 03/14/2022    MONOSABS 0.7 03/14/2022    LYMPHSABS 1.7 03/14/2022    EOSABS 0.2 03/14/2022    BASOSABS 0.1 03/14/2022       Chemistry        Component Value Date/Time     04/12/2022 0716    K 4.2 04/12/2022 0716    CL 99 04/12/2022 0716    CO2 20 (L) 04/12/2022 0716    BUN 17 04/12/2022 0716    CREATININE 1.1 04/12/2022 0716        Component Value Date/Time    CALCIUM 9.7 04/12/2022 0716    ALKPHOS 84 06/14/2021 1157    AST 22 03/14/2022 0842    ALT 36 03/14/2022 0842    BILITOT <0.2 06/14/2021 1157          Lab Results   Component Value Date    SEDRATE 6 06/14/2021     Lab Results   Component Value Date    CRP <3.0 06/14/2021     No results found for: GIULIA, LEXX, SSA, SSB, C3, C4  Lab Results   Component Value Date    RF 42.0 06/14/2021     No results found for: GIULIA, ANATITER, ANAINT, PATH  No results found for: DSDNAG, DSDNAIGGIFA  No results found for: SSAROAB, SSALAAB  No results found for: SMAB, RNPAB  No results found for: CENTABIGG  No results found for: C3, C4, ACE  No results found for: Min Obed, SKR11ICPUT  No results found for: [facility]  No results found for: [facility]  Lab Results   Component Value Date    TSHFT4 1.28 06/14/2021     No results found for: VITD25    Labs reviewed 4/21/2021  RF 31.4  ESR 6  ######################################################################    I thank you for giving me theopportunity to participate in [address] [place] [address]. If you have any questions or concerns please feel free to contact me. I look forward to following  Francisco along with you. Electronically signed by: Devang Tran MD, MD, 5/10/2022 9:13 AM    Documentation was done using voice recognition dragon software. Every effort was made to ensure accuracy;however, inadvertent unintentional computerized transcription errors may be present.

## 2022-05-10 NOTE — TELEPHONE ENCOUNTER
I have sent clinicals to [facility] [place] for a   PA on HUMIRA    The prior authorization request has been approved and is in queue to be reprocessed.   PA Date Range: 05/12/2022 through 05/11/2024  PA Number: [id number]

## 2022-10-27 DIAGNOSIS — M05.79 RHEUMATOID ARTHRITIS INVOLVING MULTIPLE SITES WITH POSITIVE RHEUMATOID FACTOR (HCC): ICD-10-CM

## 2022-10-27 RX ORDER — ADALIMUMAB 40MG/0.4ML
KIT SUBCUTANEOUS
Qty: 2 EACH | Refills: 0 | OUTPATIENT
Start: 2022-10-27

## 2022-11-16 ENCOUNTER — OFFICE VISIT (OUTPATIENT)
Dept: RHEUMATOLOGY | Age: 47
End: 2022-11-16
Payer: COMMERCIAL

## 2022-11-16 VITALS
BODY MASS INDEX: 29.89 KG/M2 | WEIGHT: 208.8 LBS | SYSTOLIC BLOOD PRESSURE: 122 MMHG | DIASTOLIC BLOOD PRESSURE: 82 MMHG | HEART RATE: 65 BPM | HEIGHT: 70 IN | OXYGEN SATURATION: 97 %

## 2022-11-16 DIAGNOSIS — Z79.899 HIGH RISK MEDICATION USE: ICD-10-CM

## 2022-11-16 DIAGNOSIS — M05.79 RHEUMATOID ARTHRITIS INVOLVING MULTIPLE SITES WITH POSITIVE RHEUMATOID FACTOR (HCC): Primary | ICD-10-CM

## 2022-11-16 DIAGNOSIS — M50.30 DDD (DEGENERATIVE DISC DISEASE), CERVICAL: ICD-10-CM

## 2022-11-16 DIAGNOSIS — M15.9 PRIMARY OSTEOARTHRITIS INVOLVING MULTIPLE JOINTS: ICD-10-CM

## 2022-11-16 DIAGNOSIS — M79.672 PAIN IN BOTH FEET: ICD-10-CM

## 2022-11-16 DIAGNOSIS — M79.671 PAIN IN BOTH FEET: ICD-10-CM

## 2022-11-16 LAB
ALT SERPL-CCNC: 36 U/L (ref 10–40)
AST SERPL-CCNC: 22 U/L (ref 15–37)
BASOPHILS ABSOLUTE: 0.1 K/UL (ref 0–0.2)
BASOPHILS RELATIVE PERCENT: 1.6 %
CHOLESTEROL, TOTAL: 238 MG/DL (ref 0–199)
CREAT SERPL-MCNC: 1.1 MG/DL (ref 0.9–1.3)
EOSINOPHILS ABSOLUTE: 0.3 K/UL (ref 0–0.6)
EOSINOPHILS RELATIVE PERCENT: 5.1 %
GFR SERPL CREATININE-BSD FRML MDRD: >60 ML/MIN/{1.73_M2}
HCT VFR BLD CALC: 45.3 % (ref 40.5–52.5)
HDLC SERPL-MCNC: 41 MG/DL (ref 40–60)
HEMOGLOBIN: 15.2 G/DL (ref 13.5–17.5)
LDL CHOLESTEROL CALCULATED: 155 MG/DL
LYMPHOCYTES ABSOLUTE: 2.1 K/UL (ref 1–5.1)
LYMPHOCYTES RELATIVE PERCENT: 33.7 %
MCH RBC QN AUTO: 30.8 PG (ref 26–34)
MCHC RBC AUTO-ENTMCNC: 33.5 G/DL (ref 31–36)
MCV RBC AUTO: 91.8 FL (ref 80–100)
MONOCYTES ABSOLUTE: 0.6 K/UL (ref 0–1.3)
MONOCYTES RELATIVE PERCENT: 8.9 %
NEUTROPHILS ABSOLUTE: 3.1 K/UL (ref 1.7–7.7)
NEUTROPHILS RELATIVE PERCENT: 50.7 %
PDW BLD-RTO: 13.5 % (ref 12.4–15.4)
PLATELET # BLD: 217 K/UL (ref 135–450)
PMV BLD AUTO: 9.3 FL (ref 5–10.5)
RBC # BLD: 4.94 M/UL (ref 4.2–5.9)
TRIGL SERPL-MCNC: 208 MG/DL (ref 0–150)
VLDLC SERPL CALC-MCNC: 42 MG/DL
WBC # BLD: 6.2 K/UL (ref 4–11)

## 2022-11-16 PROCEDURE — 99214 OFFICE O/P EST MOD 30 MIN: CPT | Performed by: INTERNAL MEDICINE

## 2022-11-16 RX ORDER — UPADACITINIB 15 MG/1
TABLET, EXTENDED RELEASE ORAL
Qty: 30 TABLET | Refills: 2 | Status: SHIPPED | OUTPATIENT
Start: 2022-11-16

## 2022-11-16 NOTE — PROGRESS NOTES
2022  Patient Name: Clarisse Weber  : 1975  Medical Record: [id number]      ASSESSMENT AND PLAN    Assessment/Plan:      ASSESSMENT:    1. Rheumatoid arthritis involving multiple sites with positive rheumatoid factor ([facility] [address]    2. Primary osteoarthritis involving multiple joints    3. DDD (degenerative disc disease), cervical    4. High risk medication use    5. Pain in both feet          PLAN:     Dalton Varma was seen today for follow-up. Diagnoses and all orders for this visit:    Rheumatoid arthritis involving multiple sites with positive rheumatoid factor ([facility] [address]  -     Upadacitinib ER (RINVOQ) 15 MG TB24; TAKE 1 TAB BY MOUTH DAILY    Primary osteoarthritis involving multiple joints    DDD (degenerative disc disease), cervical    High risk medication use  -     ALT; Standing  -     AST; Standing  -     CBC with Auto Differential; Standing  -     Creatinine; Standing  -     Quantiferon, Incubated; Future  -     LIPID PANEL; Future    Pain in both feet  -     Cassy Metz MD, Orthopedic Surgery (Foot, Ankle), [facility]    Rheumatoid arthritis -history suggestive of inflammatory arthritis, soft tissue swelling in PIP joints,   RF, CCP positive-rheumatoid arthritis in symmetrical polyarticular distribution   also component of osteoarthritis contributing to the joint symptoms  Hand and foot x-rays with mild degenerative changes  No significant improvement with Humira, continues to be symptomatic  Minimal synovitis on joint exam  I will discontinue Humira and start Rinvoq 15 mg once a day  I will also refer to Dr. Lynwood Primrose for bilateral foot pain    Past medications:  Enbrel [muscle spasms]  Humira-no improvement  Will avoid methotrexate due to heavy alcohol use      Degenerative disc disease cervical spine -neck pain most likely due to degenerative disc disease.   No warning signs or radiculopathy  Continues to be symptomatic  Advised to continue physical therapy and meloxicam 15 mg daily  Excessive drowsiness on Flexeril and nightmares on gabapentin  Continue follow-up with a pain specialist    High risk medication use-  Side effects of Rinvoq include increased risk of opportunistic infections, malignancies, decrease in blood counts, increase in liver enzymes, blood clots, TB reactivation, increase in cholesterol/triglycerides, cardiovascular events and was explained in detail  Recommend flu and pneumonia vaccine      The patient indicates understanding of these issues and agrees with the plan. Return in about 8 weeks (around 1/11/2023). The risks and benefits of my recommendations, as well as other treatment options, benefits and side effects werediscussed with the patient. All questions were answered. MEDICATIONS  Current Outpatient Medications   Medication Sig Dispense Refill    Upadacitinib ER (RINVOQ) 15 MG TB24 TAKE 1 TAB BY MOUTH DAILY 30 tablet 2    meloxicam (MOBIC) 15 MG tablet Take 1 tablet by mouth daily 30 tablet 5    acetaminophen (TYLENOL) 325 MG tablet Take 650 mg by mouth every 6 hours as needed for Pain. No current facility-administered medications for this visit. ALLERGIES  No Known Allergies      Comments  No specialty comments available. Background history:  Aide Cox is a 52 y.o. male with no significant past medical history who is being seen for follow up evaluation of  joint pain. Symptoms started several years ago and have progressively gotten worse over the past 5 to 6 months. He has been in his hands, feet, elbows, wrists, knees and hips. He has pain on daily basis. Symptoms are worse in the hands. Pain waxes and wanes in severity. He describes his pain as aching, 5 out of 10 without any significant living or aggravating factors. He denies any swelling in the joints. Has a morning stiffness lasting for 2 to 3 hours. He has some difficulty opening doorknobs and jars.   He was given naproxen which helped initially for 2 weeks then then stopped working. He now takes ibuprofen 800 mg twice a day and alternates it with Tylenol with some relief. He had a blood work by PCP that showed RF 31.4 and ESR 6. He has a neck and the low back pain for past 6 months. He denies any trauma. He denies any radiation, tingling or numbness, weakness, bowel or bladder dysfunction or saddle anesthesia. He has stiffness in the low back in the morning lasting for up to 2 hours. Pain loosens up as a day progresses. He denies any relation to activity or rest.  He denies family history of rheumatoid arthritis. He denies psoriasis, inflammatory bowel disease, inflammatory back pain, dactylitis, enthesitis, tenosynovitis or uveitis. He denies fever, weight loss or swollen glands. He denies preceding viral infection, urinary tract infection, urinary discharge or diarrhea. Interim history: He presents for follow-up of rheumatoid arthritis and osteoarthritis. Blood work came back positive for rheumatoid arthritis. He was taken off of Enbrel due to muscle spasms. He was placed on Humira. He has not noticed any significant improvement on Humira. He continues to complain of pain, swelling and stiffness in the joints. Pain is worse in his feet. Morning stiffness last for few hours. He denies any side effects with Humira. He denies any recent fevers or infections. He reports pain in his neck associated with stiffness. He denies any radiation, tingling or numbness, weakness, bowel or bladder dysfunction. Cervical spine x-ray showed changes consistent with mild degenerative arthritis. He had MRI of the cervical spine that showED changes consistent with degenerative arthritis. He did physical therapy with some relief. He also takes meloxicam 15 mg daily with minimal benefit. He gets excessively drowsy with Flexeril. He was placed on gabapentin which caused nightmares. He was given tens units by pain specialist with minimal benefit.   He follows a pain specialist.    Hand and foot x-rays show changes consistent with mild degenerative/osteoarthritis. He denies any recent fevers or infections. HPI  Review of Systems    REVIEW OF SYSTEMS:   Constitutional: No unanticipated weight loss or fevers. Integumentary: No rash, photosensitivity, malar rash, livedo reticularis, alopecia and Raynaud's symptoms, sclerodactyly, skin tightening  Eyes: negative for visual disturbance and persistent redness, discharge from eyes   ENT: - No tinnitus, loss of hearing, vertigo, or recurrent ear infections.  - No history of nasal/oral ulcers. - No history of dry eyes/dry mouth  Cardiovascular: No history of pericarditis, chest pain or murmur or palpitations  Respiratory: No shortness of breath, cough or history of interstitial lung disease. No history of pleurisy. No history of tuberculosis or atypical infections. Gastrointestinal: No history of heart burn, dysphagia or esophageal dysmotility. No change in bowel habits or any inflammatory bowel disease. Genitourinary: No history renal disease, miscarriages. Hematologic/Lymphatic: No abnormal bruising or bleeding, blood clots or swollen lymph nodes. Neurological: No history of headaches, seizure or focal weakness. No history of neuropathies, paresthesias or hyperesthesias, facial droop, diplopia  Psychiatric: No history of bipolar disease, anxiety, depression  Endocrine: Denies any polyuria, polydipsia and osteoporosis  Allergic/Immunologic: No nasal congestion or hives. I have reviewed patients Past medical History, Social History and Family History as mentioned in her chart and this remains unchanged fromprevious.     Past Medical History:   Diagnosis Date    Pneumonia     Rheumatoid arthritis ([facility] [address]      Past Surgical History:   Procedure Laterality Date    HERNIA REPAIR Bilateral     inguinal at age 8     Social History     Socioeconomic History    Marital status:      Spouse name: Not on file    Number of children: Not on file    Years of education: Not on file    Highest education level: Not on file   Occupational History    Not on file   Tobacco Use    Smoking status: Every Day     Packs/day: 0.50     Types: Cigarettes    Smokeless tobacco: Never   Vaping Use    Vaping Use: Never used   Substance and Sexual Activity    Alcohol use: Yes     Comment: occas    Drug use: No    Sexual activity: Not on file   Other Topics Concern    Not on file   Social History Narrative    Not on file     Social Determinants of Health     Financial Resource Strain: Not on file   Food Insecurity: Not on file   Transportation Needs: Not on file   Physical Activity: Not on file   Stress: Not on file   Social Connections: Not on file   Intimate Partner Violence: Not on file   Housing Stability: Not on file     History reviewed. No pertinent family history.       PHYSICAL EXAM   Vitals:    11/16/22 1048   BP: 122/82   Site: Left Upper Arm   Position: Sitting   Cuff Size: Large Adult   Pulse: 65   SpO2: 97%   Weight: 208 lb 12.8 oz (94.7 kg)   Height: 5' 10\" (1.778 m)     Physical Exam  Constitutional:  Well developed, well nourished, no acute distress, non-toxic appearance   Musculoskeletal:    RIGHT  Swell  Tender  ROM  LEFT  Swell  Tender  ROM    DIP2  0  0   Heberden  0  0   Heberden   DIP3  0  0   Heberden  0  0   Heberden   DIP4  0  0   Heberden  0  0   Heberden   DIP5  0  0   Heberden  0  0   Heberden   PIP1  0  0  FULL   0  0  FULL    PIP2  0  + FULL   0  + FULL    PIP3  0  +  FULL   0  +  FULL    PIP4  0  +  FULL   0  +  FULL    PIP5  0  +  FULL   0  +  FULL    MCP1  0  0  FULL   0  0  FULL    MCP2  0  0   bony change  0  0  FULL    MCP3  0  0   bony change  0  0  FULL    MCP4  0  0  FULL   0  0  FULL    MCP5  0  0  FULL   0  0  FULL    Wrist  0  0  FULL   0  0  FULL    Elbow  0  + FULL   0  + FULL    Shouldr  0  0  FULL   0  0  FULL    Hip  0  0  FULL   0  0  FULL    Knee  0  0   crepitus  0  0   crepitus   Ankle  0  0  FULL   0  0 FULL    MTP1  0  0   hallux valgus  0  0   hallux valgus   MTP2  0  0   bony change  0  0   bony change   MTP3  + +  bony change  + ++  bony change   MTP4  0  0  FULL   0  0  FULL    MTP5  0  0  FULL   0  0  FULL    IP1  0  0  FULL   0  0  FULL    IP2  0  0  FULL   0  0  FULL    IP3  0  0  FULL   0  0  FULL    IP4  0  0  FULL   0  0  FULL    IP5  0  0  FULL   0 0 FULL       Squaring, tenderness and bony enlargement of bilateral CMC joints  Ambulates without assistance, normal gait  Neck: Full ROM, supple, mild tenderness  Back- no tenderness. Bilateral SLR negative  Eyes:  PERRL, extra ocular movements intact, conjunctiva normal   HEENT:  Atraumatic, normocephalic, external ears normal, oropharynx moist, no pharyngeal exudates. Respiratory:  No respiratory distress  GI:  Soft, nondistended, normal bowel sounds, nontender, noorganomegaly, no mass, no rebound, no guarding   :  No costovertebral angle tenderness   Integument:  Well hydrated, no rash or telangiectasias  Lymphatic:  No lymphadenopathy noted   Neurologic:   Alert & oriented x 3, CN 2-12 normal, no focal deficits noted. Sensations Intact. Muscle strength 5/5 proximallyand distally in upper and lower extremities.    Psychiatric:  Speech and behavior appropriate           LABS AND IMAGING  Outside data reviewed and in HPI    Lab Results   Component Value Date/Time    WBC 5.2 03/14/2022 08:42 AM    RBC 4.94 03/14/2022 08:42 AM    HGB 15.2 03/14/2022 08:42 AM    HCT 45.1 03/14/2022 08:42 AM     03/14/2022 08:42 AM    MCV 91.4 03/14/2022 08:42 AM    MCH 30.9 03/14/2022 08:42 AM    MCHC 33.8 03/14/2022 08:42 AM    RDW 13.5 03/14/2022 08:42 AM    LYMPHOPCT 31.5 03/14/2022 08:42 AM    MONOPCT 12.6 03/14/2022 08:42 AM    BASOPCT 1.0 03/14/2022 08:42 AM    MONOSABS 0.7 03/14/2022 08:42 AM    LYMPHSABS 1.7 03/14/2022 08:42 AM    EOSABS 0.2 03/14/2022 08:42 AM    BASOSABS 0.1 03/14/2022 08:42 AM       Chemistry        Component Value Date/Time     04/12/2022 0716    K 4.2 04/12/2022 0716    CL 99 04/12/2022 0716    CO2 20 (L) 04/12/2022 0716    BUN 17 04/12/2022 0716    CREATININE 1.1 04/12/2022 0716        Component Value Date/Time    CALCIUM 9.7 04/12/2022 0716    ALKPHOS 84 06/14/2021 1157    AST 22 03/14/2022 0842    ALT 36 03/14/2022 0842    BILITOT <0.2 06/14/2021 1157          Lab Results   Component Value Date    SEDRATE 6 06/14/2021     Lab Results   Component Value Date    CRP <3.0 06/14/2021     No results found for: GIULIA, LEXX, SSA, SSB, C3, C4  Lab Results   Component Value Date/Time    RF 42.0 06/14/2021 11:57 AM     No results found for: GIULIA, ANATITER, ANAINT, PATH  No results found for: DSDNAG, DSDNAIGGIFA  No results found for: SSAROAB, SSALAAB  No results found for: SMAB, RNPAB  No results found for: CENTABIGG  No results found for: C3, C4, ACE  No results found for: Gerrianne Massy, OMN76VFGRP  No results found for: Ellard Dome  No results found for: Florance Heys  Lab Results   Component Value Date    TSHFT4 1.28 06/14/2021     No results found for: VITD25    Labs reviewed 4/21/2021  RF 31.4  ESR 6  ######################################################################    I thank you for giving me theopportunity to participate in [address] [facility]. If you have any questions or concerns please feel free to contact me. I look forward to following  Francisco along with you. Electronically signed by: Jose Roberto Multani MD, MD, 11/16/2022 12:17 PM    Documentation was done using voice recognition dragon software. Every effort was made to ensure accuracy;however, inadvertent unintentional computerized transcription errors may be present.

## 2022-11-21 LAB
QUANTI TB GOLD PLUS: NEGATIVE
QUANTI TB1 MINUS NIL: 0 IU/ML (ref 0–0.34)
QUANTI TB2 MINUS NIL: 0 IU/ML (ref 0–0.34)
QUANTIFERON MITOGEN: >10 IU/ML
QUANTIFERON NIL: 0.03 IU/ML

## 2022-11-28 RX ORDER — ADALIMUMAB 40MG/0.4ML
KIT SUBCUTANEOUS
Refills: 10 | OUTPATIENT
Start: 2022-11-28

## 2022-12-01 ENCOUNTER — OFFICE VISIT (OUTPATIENT)
Dept: ORTHOPEDIC SURGERY | Age: 47
End: 2022-12-01

## 2022-12-01 VITALS — WEIGHT: 212 LBS | BODY MASS INDEX: 30.35 KG/M2 | HEIGHT: 70 IN

## 2022-12-01 DIAGNOSIS — M79.671 PAIN IN BOTH FEET: ICD-10-CM

## 2022-12-01 DIAGNOSIS — M79.672 PAIN IN BOTH FEET: ICD-10-CM

## 2022-12-01 DIAGNOSIS — M92.72 FREIBERG'S INFRACTION, LEFT: Primary | ICD-10-CM

## 2022-12-01 RX ORDER — NAPROXEN 500 MG/1
500 TABLET ORAL 2 TIMES DAILY WITH MEALS
Qty: 60 TABLET | Refills: 0 | Status: SHIPPED | OUTPATIENT
Start: 2022-12-01 | End: 2022-12-31

## 2022-12-01 NOTE — PROGRESS NOTES
CHIEF COMPLAINT:   1-Bilateral great toe pain/Bunion. 2-bilateral foot left greater than right 2nd toe forefoot pain/ Freiberg's infraction      HISTORY:  Mr. Jeff Mcgraw 52 y.o.  male presents today for consultation request from Dr. Kiko Linder MD for evaluation of bilateral great and 2nd toe pain which started to worsen many years ago. He has rheumatoid arthritis and is on Renvoq. He is complaining of achy pain. Pain is increase with standing and walking and shoe wear. Pain is sharp early in the morning with first few steps, dull achy pain by the end of the day. Rates pain a 9/10 VAS by the end of the day. No radiation and no numbness and tingling sensation. He does have increased swelling. No other complaint. No h/o trauma or gout. He has seen a D.P.M. in the past and had cortisone injection many years ago but nothing recent. He was referred to us by Dr. Leida Glass his rheumatologist.  He is a smoker.     Past Medical History:   Diagnosis Date    Pneumonia     Rheumatoid arthritis ([facility] [address]        Past Surgical History:   Procedure Laterality Date    HERNIA REPAIR Bilateral     inguinal at age 8       Social History     Socioeconomic History    Marital status:      Spouse name: Not on file    Number of children: Not on file    Years of education: Not on file    Highest education level: Not on file   Occupational History    Not on file   Tobacco Use    Smoking status: Every Day     Packs/day: 0.50     Types: Cigarettes    Smokeless tobacco: Never   Vaping Use    Vaping Use: Never used   Substance and Sexual Activity    Alcohol use: Yes     Comment: occas    Drug use: No    Sexual activity: Not on file   Other Topics Concern    Not on file   Social History Narrative    Not on file     Social Determinants of Health     Financial Resource Strain: Not on file   Food Insecurity: Not on file   Transportation Needs: Not on file   Physical Activity: Not on file   Stress: Not on file   Social Connections: Not on file   Intimate Partner Violence: Not on file   Housing Stability: Not on file       No family history on file. Current Outpatient Medications on File Prior to Visit   Medication Sig Dispense Refill    Upadacitinib ER (RINVOQ) 15 MG TB24 TAKE 1 TAB BY MOUTH DAILY 30 tablet 2    meloxicam (MOBIC) 15 MG tablet Take 1 tablet by mouth daily 30 tablet 5    acetaminophen (TYLENOL) 325 MG tablet Take 650 mg by mouth every 6 hours as needed for Pain. No current facility-administered medications on file prior to visit. Pertinent items are noted in HPI  Review of systems reviewed from Patient History Form and available in the patient's chart under the Media tab. No change noted. PHYSICAL EXAMINATION:  Mr. Nain Cisneros is a very pleasant 52 y.o.  male who presents today in no acute distress, awake, alert, and oriented. He is well dressed, nourished and  groomed. Patient with normal affect. Height is  5' 10\" (1.778 m), weight is 212 lb (96.2 kg), Body mass index is 30.42 kg/m². Resting respiratory rate is 16. Examination of the gait, showed that the patient walks heel-toe with a non-antalgic gait and no limp. Examination of both ankles showing a good range of motion. He has dorsiflexion to about 5 degrees bilaterally, which increased with knee flexion. He has intact sensation and good pedal pulses. He has good strength in all four planes, including eversion, and has moderate tenderness on deep palpation over the great toe MPJ and over the bilateral second metatarsal head, with prominent medial eminence with bilateral bunion deformity. There is increased swelling in the left forefoot compared to the other side. Left foot pain is worse than the right. The ankles are stable to drawer test bilaterally, equally. Ankle reflex 1+ bilaterally. IMAGING:  Mignon Baptiste were reviewed, 3 views of the bilateral foot taken in office today, and showed no acute fracture. Bilateral bunion with hallux rigidus. Bilateral second metatarsal head AVN left greater than right. IMPRESSION:   1-Bilateral bunion. 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 We recommended stretching exercises of the calf which was taught to the patient today. He will take NSAIDS Naprosyn as needed, Rx sent and instructed in care. Use wide shoes with soft orthotics. He was instructed to follow-up in 6 weeks. He may benefit from custom orthotics with a metatarsal bar. He understands that this may need surgery if the pain did not to resolve. Thank you very much for the kind consultation and allowing me to participate in this patient's care. I will continue to keep you apprised of his progress.           Ashly Parsons MD

## 2022-12-29 DIAGNOSIS — M92.72 FREIBERG'S INFRACTION, LEFT: ICD-10-CM

## 2022-12-29 RX ORDER — NAPROXEN 500 MG/1
TABLET ORAL
Qty: 60 TABLET | Refills: 0 | Status: SHIPPED | OUTPATIENT
Start: 2022-12-29

## 2023-02-27 DIAGNOSIS — M05.79 RHEUMATOID ARTHRITIS INVOLVING MULTIPLE SITES WITH POSITIVE RHEUMATOID FACTOR (HCC): ICD-10-CM

## 2023-02-27 RX ORDER — UPADACITINIB 15 MG/1
TABLET, EXTENDED RELEASE ORAL
Qty: 30 TABLET | Refills: 1 | Status: SHIPPED | OUTPATIENT
Start: 2023-02-27

## 2023-06-05 DIAGNOSIS — M05.79 RHEUMATOID ARTHRITIS INVOLVING MULTIPLE SITES WITH POSITIVE RHEUMATOID FACTOR (HCC): ICD-10-CM

## 2023-06-07 RX ORDER — UPADACITINIB 15 MG/1
TABLET, EXTENDED RELEASE ORAL
Qty: 30 TABLET | Refills: 2 | OUTPATIENT
Start: 2023-06-07

## 2023-06-08 DIAGNOSIS — M05.79 RHEUMATOID ARTHRITIS INVOLVING MULTIPLE SITES WITH POSITIVE RHEUMATOID FACTOR (HCC): ICD-10-CM

## 2023-06-09 RX ORDER — UPADACITINIB 15 MG/1
TABLET, EXTENDED RELEASE ORAL
Qty: 30 TABLET | Refills: 10 | OUTPATIENT
Start: 2023-06-09

## 2023-07-10 DIAGNOSIS — M05.79 RHEUMATOID ARTHRITIS INVOLVING MULTIPLE SITES WITH POSITIVE RHEUMATOID FACTOR (HCC): ICD-10-CM

## 2023-07-11 RX ORDER — UPADACITINIB 15 MG/1
TABLET, EXTENDED RELEASE ORAL
Qty: 30 TABLET | Refills: 10 | OUTPATIENT
Start: 2023-07-11

## 2023-07-11 NOTE — TELEPHONE ENCOUNTER
is no longer with [facility] ([facility]). She will be starting her new location at end of July. Unable to authorize refills at this time. For urgent refills, reach out to PCP to see if a short supply can be authorized until Dr. Araceli Gee is available at her new location.

## 2024-12-23 ENCOUNTER — OFFICE VISIT (OUTPATIENT)
Dept: FAMILY MEDICINE CLINIC | Age: 49
End: 2024-12-23
Payer: COMMERCIAL

## 2024-12-23 VITALS
SYSTOLIC BLOOD PRESSURE: 126 MMHG | HEIGHT: 70 IN | WEIGHT: 208 LBS | BODY MASS INDEX: 29.78 KG/M2 | TEMPERATURE: 98.6 F | DIASTOLIC BLOOD PRESSURE: 80 MMHG

## 2024-12-23 DIAGNOSIS — M79.645 THUMB PAIN, LEFT: Primary | ICD-10-CM

## 2024-12-23 DIAGNOSIS — Z12.11 SCREEN FOR COLON CANCER: ICD-10-CM

## 2024-12-23 DIAGNOSIS — M05.9 RHEUMATOID ARTHRITIS WITH POSITIVE RHEUMATOID FACTOR, INVOLVING UNSPECIFIED SITE (HCC): ICD-10-CM

## 2024-12-23 DIAGNOSIS — Z13.1 SCREENING FOR DIABETES MELLITUS: ICD-10-CM

## 2024-12-23 DIAGNOSIS — Z13.220 SCREENING FOR LIPID DISORDERS: ICD-10-CM

## 2024-12-23 PROCEDURE — 99203 OFFICE O/P NEW LOW 30 MIN: CPT

## 2024-12-23 RX ORDER — HYDROXYCHLOROQUINE SULFATE 200 MG/1
200 TABLET, FILM COATED ORAL 2 TIMES DAILY
COMMUNITY

## 2024-12-23 RX ORDER — HYDROCORTISONE ACETATE 0.5 %
CREAM (GRAM) TOPICAL
COMMUNITY

## 2024-12-23 RX ORDER — CYCLOBENZAPRINE HCL 5 MG
5 TABLET ORAL NIGHTLY
COMMUNITY

## 2024-12-23 RX ORDER — ERGOCALCIFEROL 1.25 MG/1
50000 CAPSULE, LIQUID FILLED ORAL WEEKLY
COMMUNITY

## 2024-12-23 SDOH — ECONOMIC STABILITY: FOOD INSECURITY: WITHIN THE PAST 12 MONTHS, YOU WORRIED THAT YOUR FOOD WOULD RUN OUT BEFORE YOU GOT MONEY TO BUY MORE.: NEVER TRUE

## 2024-12-23 SDOH — ECONOMIC STABILITY: INCOME INSECURITY: HOW HARD IS IT FOR YOU TO PAY FOR THE VERY BASICS LIKE FOOD, HOUSING, MEDICAL CARE, AND HEATING?: NOT HARD AT ALL

## 2024-12-23 SDOH — ECONOMIC STABILITY: FOOD INSECURITY: WITHIN THE PAST 12 MONTHS, THE FOOD YOU BOUGHT JUST DIDN'T LAST AND YOU DIDN'T HAVE MONEY TO GET MORE.: NEVER TRUE

## 2024-12-23 ASSESSMENT — PATIENT HEALTH QUESTIONNAIRE - PHQ9
SUM OF ALL RESPONSES TO PHQ QUESTIONS 1-9: 0
2. FEELING DOWN, DEPRESSED OR HOPELESS: NOT AT ALL
SUM OF ALL RESPONSES TO PHQ9 QUESTIONS 1 & 2: 0
1. LITTLE INTEREST OR PLEASURE IN DOING THINGS: NOT AT ALL

## 2024-12-23 ASSESSMENT — ENCOUNTER SYMPTOMS
APNEA: 0
VOMITING: 0
COLOR CHANGE: 0
BACK PAIN: 0
WHEEZING: 0
TROUBLE SWALLOWING: 0
NAUSEA: 0
SINUS PRESSURE: 0
CONSTIPATION: 0
COUGH: 0
SORE THROAT: 0
DIARRHEA: 0
SHORTNESS OF BREATH: 0
ABDOMINAL PAIN: 0
BLOOD IN STOOL: 0

## 2024-12-23 NOTE — PROGRESS NOTES
many years ago. He states this past 1-2 weeks the pain has flared up, feels he may need to see hand specialist again.    Drives a motorcycle, wearing helmet.      Past Medical History:   Diagnosis Date    Pneumonia     Rheumatoid arthritis (HCC)     Vitamin D deficiency      Current Outpatient Medications   Medication Sig Dispense Refill    cyclobenzaprine (FLEXERIL) 5 MG tablet Take 1 tablet by mouth nightly      vitamin D (ERGOCALCIFEROL) 1.25 MG (47413 UT) CAPS capsule Take 1 capsule by mouth once a week      hydroxychloroquine (PLAQUENIL) 200 MG tablet Take 1 tablet by mouth 2 times daily      Glucosamine-Chondroit-Vit C-Mn (GLUCOSAMINE 1500 COMPLEX) CAPS Take by mouth      meloxicam (MOBIC) 15 MG tablet Take 1 tablet by mouth daily 30 tablet 5     No current facility-administered medications for this visit.       Review of Systems   Constitutional:  Negative for activity change, fatigue, fever and unexpected weight change.   HENT:  Negative for congestion, nosebleeds, sinus pressure, sore throat and trouble swallowing.    Eyes:  Negative for visual disturbance.   Respiratory:  Negative for apnea, cough, shortness of breath and wheezing.    Cardiovascular:  Negative for chest pain, palpitations and leg swelling.   Gastrointestinal:  Negative for abdominal pain, blood in stool, constipation, diarrhea, nausea and vomiting.   Endocrine: Negative for cold intolerance and heat intolerance.   Genitourinary:  Negative for difficulty urinating, dysuria, flank pain, frequency and urgency.   Musculoskeletal:  Positive for arthralgias (left thumb). Negative for back pain, gait problem and myalgias.   Skin:  Negative for color change, rash and wound.   Neurological:  Negative for dizziness, weakness, light-headedness and headaches.   Hematological:  Does not bruise/bleed easily.   Psychiatric/Behavioral:  Negative for dysphoric mood, sleep disturbance and suicidal ideas. The patient is not nervous/anxious.        Vitals:

## 2024-12-23 NOTE — PATIENT INSTRUCTIONS
Thank you for choosing [facility].    Please bring a current list of medications to every appointment.    Please contact your pharmacy for any prescription refill(s) that you are requesting.

## 2025-01-20 DIAGNOSIS — M05.9 RHEUMATOID ARTHRITIS WITH POSITIVE RHEUMATOID FACTOR, INVOLVING UNSPECIFIED SITE (HCC): ICD-10-CM

## 2025-01-20 DIAGNOSIS — Z13.220 SCREENING FOR LIPID DISORDERS: ICD-10-CM

## 2025-01-20 DIAGNOSIS — Z13.1 SCREENING FOR DIABETES MELLITUS: ICD-10-CM

## 2025-01-20 LAB
ALBUMIN SERPL-MCNC: 4.9 G/DL (ref 3.4–5)
ALBUMIN/GLOB SERPL: 2 {RATIO} (ref 1.1–2.2)
ALP SERPL-CCNC: 86 U/L (ref 40–129)
ALT SERPL-CCNC: 47 U/L (ref 10–40)
ANION GAP SERPL CALCULATED.3IONS-SCNC: 12 MMOL/L (ref 3–16)
AST SERPL-CCNC: 29 U/L (ref 15–37)
BILIRUB SERPL-MCNC: <0.2 MG/DL (ref 0–1)
BUN SERPL-MCNC: 12 MG/DL (ref 7–20)
CALCIUM SERPL-MCNC: 10.3 MG/DL (ref 8.3–10.6)
CHLORIDE SERPL-SCNC: 101 MMOL/L (ref 99–110)
CHOLEST SERPL-MCNC: 213 MG/DL (ref 0–199)
CO2 SERPL-SCNC: 28 MMOL/L (ref 21–32)
CREAT SERPL-MCNC: 1 MG/DL (ref 0.9–1.3)
GFR SERPLBLD CREATININE-BSD FMLA CKD-EPI: >90 ML/MIN/{1.73_M2}
GLUCOSE SERPL-MCNC: 106 MG/DL (ref 70–99)
HDLC SERPL-MCNC: 55 MG/DL (ref 40–60)
LDLC SERPL CALC-MCNC: 139 MG/DL
POTASSIUM SERPL-SCNC: 4.6 MMOL/L (ref 3.5–5.1)
PROT SERPL-MCNC: 7.4 G/DL (ref 6.4–8.2)
SODIUM SERPL-SCNC: 141 MMOL/L (ref 136–145)
TRIGL SERPL-MCNC: 97 MG/DL (ref 0–150)
VLDLC SERPL CALC-MCNC: 19 MG/DL

## 2025-01-21 LAB
EST. AVERAGE GLUCOSE BLD GHB EST-MCNC: 105.4 MG/DL
HBA1C MFR BLD: 5.3 %

## 2025-01-22 PROBLEM — Z13.220 SCREENING FOR LIPID DISORDERS: Status: RESOLVED | Noted: 2024-12-23 | Resolved: 2025-01-22

## 2025-01-22 PROBLEM — Z12.11 SCREEN FOR COLON CANCER: Status: RESOLVED | Noted: 2024-12-23 | Resolved: 2025-01-22

## 2025-06-27 ENCOUNTER — OFFICE VISIT (OUTPATIENT)
Dept: FAMILY MEDICINE CLINIC | Age: 50
End: 2025-06-27
Payer: COMMERCIAL

## 2025-06-27 VITALS
TEMPERATURE: 98.3 F | DIASTOLIC BLOOD PRESSURE: 88 MMHG | SYSTOLIC BLOOD PRESSURE: 130 MMHG | BODY MASS INDEX: 29.55 KG/M2 | HEIGHT: 70 IN | WEIGHT: 206.4 LBS

## 2025-06-27 DIAGNOSIS — R06.83 SNORING: ICD-10-CM

## 2025-06-27 DIAGNOSIS — M05.9 RHEUMATOID ARTHRITIS WITH POSITIVE RHEUMATOID FACTOR, INVOLVING UNSPECIFIED SITE (HCC): ICD-10-CM

## 2025-06-27 DIAGNOSIS — Z00.00 ROUTINE GENERAL MEDICAL EXAMINATION AT A HEALTH CARE FACILITY: Primary | ICD-10-CM

## 2025-06-27 PROCEDURE — 99396 PREV VISIT EST AGE 40-64: CPT

## 2025-06-27 PROCEDURE — 99213 OFFICE O/P EST LOW 20 MIN: CPT

## 2025-06-27 SDOH — ECONOMIC STABILITY: FOOD INSECURITY: WITHIN THE PAST 12 MONTHS, THE FOOD YOU BOUGHT JUST DIDN'T LAST AND YOU DIDN'T HAVE MONEY TO GET MORE.: NEVER TRUE

## 2025-06-27 SDOH — ECONOMIC STABILITY: FOOD INSECURITY: WITHIN THE PAST 12 MONTHS, YOU WORRIED THAT YOUR FOOD WOULD RUN OUT BEFORE YOU GOT MONEY TO BUY MORE.: NEVER TRUE

## 2025-06-27 ASSESSMENT — ENCOUNTER SYMPTOMS
COUGH: 0
APNEA: 0
SINUS PRESSURE: 0
DIARRHEA: 0
WHEEZING: 0
NAUSEA: 0
VOMITING: 0
BACK PAIN: 0
SORE THROAT: 0
BLOOD IN STOOL: 0
CONSTIPATION: 0
ABDOMINAL PAIN: 0
COLOR CHANGE: 0
TROUBLE SWALLOWING: 0
SHORTNESS OF BREATH: 0

## 2025-06-27 ASSESSMENT — PATIENT HEALTH QUESTIONNAIRE - PHQ9
1. LITTLE INTEREST OR PLEASURE IN DOING THINGS: NOT AT ALL
2. FEELING DOWN, DEPRESSED OR HOPELESS: NOT AT ALL
SUM OF ALL RESPONSES TO PHQ QUESTIONS 1-9: 0

## 2025-06-27 NOTE — PATIENT INSTRUCTIONS
Magnesium- sleeping and cramps  Mens one a day vitamin  Continue vitamin D  Consider Unisom as a sleep aid       Continue good water intake 64 oz daily  Monitor salt in diet

## 2025-06-27 NOTE — ASSESSMENT & PLAN NOTE
Patient states he does snore at night, stops breathing occasionally.  He states many years ago he had seen a doctor for possible sleep apnea.  Not currently on a CPAP.  Recommend referral back to sleep medicine as patient is having having higher blood pressure readings as well and discussed that this may contribute.  Patient voiced understanding

## 2025-06-27 NOTE — PROGRESS NOTES
states he believes he has sleep apnea, has not followed with sleep medicine for this.  Currently not on a CPAP  Admits he could work on water intake  Low-salt diet  No changes in stressors   He does state sleep is overall poor due to history of neck pain, currently getting nerve ablations done  Not currently taking any medication to help with sleep, occasionally taking Flexeril in the evening    Overall bowels regular, denies blood in stool  Due for colonoscopy    Hearing and vision stable  Up-to-date eye exam    Due for Tdap booster    Currently taking vitamin D weekly    Past Medical History:   Diagnosis Date    Pneumonia     Rheumatoid arthritis (HCC)     Vitamin D deficiency        Current Outpatient Medications   Medication Sig Dispense Refill    cyclobenzaprine (FLEXERIL) 5 MG tablet Take 1 tablet by mouth nightly      vitamin D (ERGOCALCIFEROL) 1.25 MG (03345 UT) CAPS capsule Take 1 capsule by mouth once a week      hydroxychloroquine (PLAQUENIL) 200 MG tablet Take 1 tablet by mouth 2 times daily      Glucosamine-Chondroit-Vit C-Mn (GLUCOSAMINE 1500 COMPLEX) CAPS Take by mouth      meloxicam (MOBIC) 15 MG tablet Take 1 tablet by mouth daily 30 tablet 5     No current facility-administered medications for this visit.       Review of Systems   Constitutional:  Negative for activity change, fatigue, fever and unexpected weight change.   HENT:  Negative for congestion, nosebleeds, sinus pressure, sore throat and trouble swallowing.    Eyes:  Negative for visual disturbance.   Respiratory:  Negative for apnea, cough, shortness of breath and wheezing.    Cardiovascular:  Negative for chest pain, palpitations and leg swelling.   Gastrointestinal:  Negative for abdominal pain, blood in stool, constipation, diarrhea, nausea and vomiting.   Endocrine: Negative for cold intolerance and heat intolerance.   Genitourinary:  Negative for difficulty urinating, dysuria, flank pain, frequency and urgency.   Musculoskeletal:

## 2025-06-27 NOTE — ASSESSMENT & PLAN NOTE
Monitored by specialist- no acute findings meriting change in the plan. Continues to follow with rheum

## 2025-06-27 NOTE — ASSESSMENT & PLAN NOTE
Well exam in office today, we reviewed recent medical history, vitals, medicines.  Labs reviewed from January 2025 stable  Blood pressure overall borderline elevated in office today, recommend increasing water intake, monitoring salt in diet.  I do recommend follow-up with sleep medicine, referral placed  Patient to consider colonoscopy, discussed benefits  Consider Tdap booster  Follow-up 1 year or as needed